# Patient Record
Sex: FEMALE | Race: WHITE | NOT HISPANIC OR LATINO | Employment: FULL TIME | URBAN - METROPOLITAN AREA
[De-identification: names, ages, dates, MRNs, and addresses within clinical notes are randomized per-mention and may not be internally consistent; named-entity substitution may affect disease eponyms.]

---

## 2017-01-02 ENCOUNTER — GENERIC CONVERSION - ENCOUNTER (OUTPATIENT)
Dept: OTHER | Facility: OTHER | Age: 37
End: 2017-01-02

## 2017-01-16 ENCOUNTER — ALLSCRIPTS OFFICE VISIT (OUTPATIENT)
Dept: OTHER | Facility: OTHER | Age: 37
End: 2017-01-16

## 2017-01-16 ENCOUNTER — GENERIC CONVERSION - ENCOUNTER (OUTPATIENT)
Dept: OTHER | Facility: OTHER | Age: 37
End: 2017-01-16

## 2017-01-20 ENCOUNTER — GENERIC CONVERSION - ENCOUNTER (OUTPATIENT)
Dept: OTHER | Facility: OTHER | Age: 37
End: 2017-01-20

## 2017-02-01 ENCOUNTER — GENERIC CONVERSION - ENCOUNTER (OUTPATIENT)
Dept: OTHER | Facility: OTHER | Age: 37
End: 2017-02-01

## 2017-02-02 ENCOUNTER — LAB CONVERSION - ENCOUNTER (OUTPATIENT)
Dept: FAMILY MEDICINE CLINIC | Facility: CLINIC | Age: 37
End: 2017-02-02

## 2017-02-03 ENCOUNTER — ALLSCRIPTS OFFICE VISIT (OUTPATIENT)
Dept: OTHER | Facility: OTHER | Age: 37
End: 2017-02-03

## 2017-02-06 ENCOUNTER — ALLSCRIPTS OFFICE VISIT (OUTPATIENT)
Dept: OTHER | Facility: OTHER | Age: 37
End: 2017-02-06

## 2017-02-08 ENCOUNTER — GENERIC CONVERSION - ENCOUNTER (OUTPATIENT)
Dept: OTHER | Facility: OTHER | Age: 37
End: 2017-02-08

## 2017-03-13 ENCOUNTER — GENERIC CONVERSION - ENCOUNTER (OUTPATIENT)
Dept: OTHER | Facility: OTHER | Age: 37
End: 2017-03-13

## 2017-03-16 ENCOUNTER — GENERIC CONVERSION - ENCOUNTER (OUTPATIENT)
Dept: OTHER | Facility: OTHER | Age: 37
End: 2017-03-16

## 2017-03-30 ENCOUNTER — GENERIC CONVERSION - ENCOUNTER (OUTPATIENT)
Dept: OTHER | Facility: OTHER | Age: 37
End: 2017-03-30

## 2017-04-02 LAB
EXTERNAL ABO GROUPING: NORMAL
EXTERNAL ANTIBODY SCREEN: NORMAL
EXTERNAL HEMATOCRIT: 43.3 %
EXTERNAL HEMOGLOBIN: 14.7 G/DL
EXTERNAL HEPATITIS B SURFACE ANTIGEN: NORMAL
EXTERNAL HIV-1 ANTIBODY: NORMAL
EXTERNAL PLATELET COUNT: 161 K/ΜL
EXTERNAL RH FACTOR: POSITIVE
EXTERNAL RUBELLA IGG QUANTITATION: NORMAL
EXTERNAL SYPHILIS RPR SCREEN: NORMAL

## 2017-04-22 LAB
EXTERNAL CHLAMYDIA SCREEN: NORMAL
EXTERNAL GONORRHEA SCREEN: NORMAL

## 2017-05-12 ENCOUNTER — GENERIC CONVERSION - ENCOUNTER (OUTPATIENT)
Dept: OTHER | Facility: OTHER | Age: 37
End: 2017-05-12

## 2017-05-15 ENCOUNTER — ALLSCRIPTS OFFICE VISIT (OUTPATIENT)
Dept: PERINATAL CARE | Facility: CLINIC | Age: 37
End: 2017-05-15
Payer: COMMERCIAL

## 2017-05-15 ENCOUNTER — GENERIC CONVERSION - ENCOUNTER (OUTPATIENT)
Dept: OTHER | Facility: OTHER | Age: 37
End: 2017-05-15

## 2017-05-15 PROCEDURE — 76805 OB US >/= 14 WKS SNGL FETUS: CPT | Performed by: OBSTETRICS & GYNECOLOGY

## 2017-05-30 ENCOUNTER — ALLSCRIPTS OFFICE VISIT (OUTPATIENT)
Dept: OTHER | Facility: OTHER | Age: 37
End: 2017-05-30

## 2017-05-30 DIAGNOSIS — E04.1 NONTOXIC SINGLE THYROID NODULE: ICD-10-CM

## 2017-06-01 ENCOUNTER — GENERIC CONVERSION - ENCOUNTER (OUTPATIENT)
Dept: OTHER | Facility: OTHER | Age: 37
End: 2017-06-01

## 2017-06-01 ENCOUNTER — ALLSCRIPTS OFFICE VISIT (OUTPATIENT)
Dept: PERINATAL CARE | Facility: CLINIC | Age: 37
End: 2017-06-01
Payer: COMMERCIAL

## 2017-06-01 PROCEDURE — 76815 OB US LIMITED FETUS(S): CPT | Performed by: OBSTETRICS & GYNECOLOGY

## 2017-06-01 PROCEDURE — 76817 TRANSVAGINAL US OBSTETRIC: CPT | Performed by: OBSTETRICS & GYNECOLOGY

## 2017-06-06 ENCOUNTER — HOSPITAL ENCOUNTER (OUTPATIENT)
Dept: ULTRASOUND IMAGING | Facility: MEDICAL CENTER | Age: 37
Discharge: HOME | End: 2017-06-06
Payer: COMMERCIAL

## 2017-06-06 DIAGNOSIS — E04.1 NONTOXIC SINGLE THYROID NODULE: ICD-10-CM

## 2017-06-06 PROCEDURE — 76536 US EXAM OF HEAD AND NECK: CPT

## 2017-06-12 ENCOUNTER — GENERIC CONVERSION - ENCOUNTER (OUTPATIENT)
Dept: OTHER | Facility: OTHER | Age: 37
End: 2017-06-12

## 2017-06-12 ENCOUNTER — ALLSCRIPTS OFFICE VISIT (OUTPATIENT)
Dept: PERINATAL CARE | Facility: CLINIC | Age: 37
End: 2017-06-12
Payer: COMMERCIAL

## 2017-06-12 PROCEDURE — 76815 OB US LIMITED FETUS(S): CPT | Performed by: OBSTETRICS & GYNECOLOGY

## 2017-06-12 PROCEDURE — 76817 TRANSVAGINAL US OBSTETRIC: CPT | Performed by: OBSTETRICS & GYNECOLOGY

## 2017-06-13 ENCOUNTER — GENERIC CONVERSION - ENCOUNTER (OUTPATIENT)
Dept: OTHER | Facility: OTHER | Age: 37
End: 2017-06-13

## 2017-06-30 ENCOUNTER — GENERIC CONVERSION - ENCOUNTER (OUTPATIENT)
Dept: OTHER | Facility: OTHER | Age: 37
End: 2017-06-30

## 2017-06-30 ENCOUNTER — ALLSCRIPTS OFFICE VISIT (OUTPATIENT)
Dept: PERINATAL CARE | Facility: CLINIC | Age: 37
End: 2017-06-30
Payer: COMMERCIAL

## 2017-06-30 PROCEDURE — 76811 OB US DETAILED SNGL FETUS: CPT | Performed by: OBSTETRICS & GYNECOLOGY

## 2017-06-30 PROCEDURE — 76817 TRANSVAGINAL US OBSTETRIC: CPT | Performed by: OBSTETRICS & GYNECOLOGY

## 2017-08-10 LAB — GLUCOSE P FAST SERPL-MCNC: 123 MG/DL

## 2017-09-09 LAB
EXTERNAL HEMATOCRIT: 39.9 %
EXTERNAL HEMOGLOBIN: 14 G/DL
EXTERNAL PLATELET COUNT: 107 K/ΜL

## 2017-09-21 ENCOUNTER — HOSPITAL ENCOUNTER (OUTPATIENT)
Facility: HOSPITAL | Age: 37
Discharge: HOME | End: 2017-09-21
Attending: OBSTETRICS & GYNECOLOGY | Admitting: OBSTETRICS & GYNECOLOGY
Payer: COMMERCIAL

## 2017-09-21 VITALS
BODY MASS INDEX: 26.05 KG/M2 | WEIGHT: 147 LBS | TEMPERATURE: 98.3 F | RESPIRATION RATE: 20 BRPM | DIASTOLIC BLOOD PRESSURE: 84 MMHG | HEART RATE: 73 BPM | SYSTOLIC BLOOD PRESSURE: 134 MMHG | HEIGHT: 63 IN

## 2017-09-21 DIAGNOSIS — Z3A.33 33 WEEKS GESTATION OF PREGNANCY: ICD-10-CM

## 2017-09-21 PROCEDURE — G0463 HOSPITAL OUTPT CLINIC VISIT: HCPCS

## 2017-09-21 PROCEDURE — 99214 OFFICE O/P EST MOD 30 MIN: CPT

## 2017-09-21 PROCEDURE — 76817 TRANSVAGINAL US OBSTETRIC: CPT | Performed by: OBSTETRICS & GYNECOLOGY

## 2017-09-21 PROCEDURE — 59025 FETAL NON-STRESS TEST: CPT | Performed by: OBSTETRICS & GYNECOLOGY

## 2017-09-21 RX ORDER — LEVOTHYROXINE SODIUM 0.03 MG/1
25 TABLET ORAL DAILY
COMMUNITY
End: 2018-05-27 | Stop reason: SDUPTHER

## 2017-10-11 ENCOUNTER — ANESTHESIA EVENT (INPATIENT)
Dept: LABOR AND DELIVERY | Facility: HOSPITAL | Age: 37
End: 2017-10-11
Payer: COMMERCIAL

## 2017-10-11 ENCOUNTER — ANESTHESIA (INPATIENT)
Dept: LABOR AND DELIVERY | Facility: HOSPITAL | Age: 37
End: 2017-10-11
Payer: COMMERCIAL

## 2017-10-11 ENCOUNTER — HOSPITAL ENCOUNTER (INPATIENT)
Facility: HOSPITAL | Age: 37
LOS: 2 days | Discharge: HOME | End: 2017-10-13
Attending: OBSTETRICS & GYNECOLOGY | Admitting: OBSTETRICS & GYNECOLOGY
Payer: COMMERCIAL

## 2017-10-11 DIAGNOSIS — D69.6 GESTATIONAL THROMBOCYTOPENIA (HCC): ICD-10-CM

## 2017-10-11 DIAGNOSIS — O42.019: ICD-10-CM

## 2017-10-11 DIAGNOSIS — Z3A.36 PREGNANCY WITH 36 COMPLETED WEEKS GESTATION: ICD-10-CM

## 2017-10-11 DIAGNOSIS — O42.90 AMNIOTIC FLUID LEAKING: ICD-10-CM

## 2017-10-11 DIAGNOSIS — O99.119 GESTATIONAL THROMBOCYTOPENIA (HCC): ICD-10-CM

## 2017-10-11 DIAGNOSIS — E04.1 THYROID NODULE: ICD-10-CM

## 2017-10-11 LAB
ABO GROUP BLD: NORMAL
BASE EXCESS BLDCOA CALC-SCNC: -4.2 MMOL/L (ref 3–11)
BASE EXCESS BLDCOV CALC-SCNC: -2.8 MMOL/L (ref 1–9)
BASOPHILS # BLD AUTO: 0.01 THOUSANDS/ΜL (ref 0–0.1)
BASOPHILS NFR BLD AUTO: 0 % (ref 0–1)
BLD GP AB SCN SERPL QL: NEGATIVE
EOSINOPHIL # BLD AUTO: 0.04 THOUSAND/ΜL (ref 0–0.61)
EOSINOPHIL NFR BLD AUTO: 0 % (ref 0–6)
ERYTHROCYTE [DISTWIDTH] IN BLOOD BY AUTOMATED COUNT: 12.9 % (ref 11.6–15.1)
HCO3 BLDCOA-SCNC: 25.7 MMOL/L (ref 17.3–27.3)
HCO3 BLDCOV-SCNC: 22.4 MMOL/L (ref 12.2–28.6)
HCT VFR BLD AUTO: 42.2 % (ref 34.8–46.1)
HGB BLD-MCNC: 15 G/DL (ref 11.5–15.4)
LYMPHOCYTES # BLD AUTO: 1.62 THOUSANDS/ΜL (ref 0.6–4.47)
LYMPHOCYTES NFR BLD AUTO: 15 % (ref 14–44)
MCH RBC QN AUTO: 32.5 PG (ref 26.8–34.3)
MCHC RBC AUTO-ENTMCNC: 35.5 G/DL (ref 31.4–37.4)
MCV RBC AUTO: 91 FL (ref 82–98)
MONOCYTES # BLD AUTO: 0.55 THOUSAND/ΜL (ref 0.17–1.22)
MONOCYTES NFR BLD AUTO: 5 % (ref 4–12)
NEUTROPHILS # BLD AUTO: 8.63 THOUSANDS/ΜL (ref 1.85–7.62)
NEUTS SEG NFR BLD AUTO: 80 % (ref 43–75)
O2 CT VFR BLDCOA CALC: 4.6 ML/DL
OXYHGB MFR BLDCOA: 19.6 %
OXYHGB MFR BLDCOV: 67.3 %
PCO2 BLDCOA: 67.4 MM[HG] (ref 30–60)
PCO2 BLDCOV: 40.3 MM HG (ref 27–43)
PH BLDCOA: 7.2 [PH] (ref 7.23–7.43)
PH BLDCOV: 7.36 [PH] (ref 7.19–7.49)
PLATELET # BLD AUTO: 116 THOUSANDS/UL (ref 149–390)
PMV BLD AUTO: 13.2 FL (ref 8.9–12.7)
PO2 BLDCOA: 13.5 MM HG (ref 5–25)
PO2 BLDCOV: 27.6 MM HG (ref 15–45)
RBC # BLD AUTO: 4.62 MILLION/UL (ref 3.81–5.12)
RH BLD: POSITIVE
SAO2 % BLDCOV: 17 ML/DL
SPECIMEN EXPIRATION DATE: NORMAL
WBC # BLD AUTO: 10.85 THOUSAND/UL (ref 4.31–10.16)

## 2017-10-11 PROCEDURE — 85025 COMPLETE CBC W/AUTO DIFF WBC: CPT | Performed by: OBSTETRICS & GYNECOLOGY

## 2017-10-11 PROCEDURE — 86900 BLOOD TYPING SEROLOGIC ABO: CPT | Performed by: OBSTETRICS & GYNECOLOGY

## 2017-10-11 PROCEDURE — 99211 OFF/OP EST MAY X REQ PHY/QHP: CPT

## 2017-10-11 PROCEDURE — 86901 BLOOD TYPING SEROLOGIC RH(D): CPT | Performed by: OBSTETRICS & GYNECOLOGY

## 2017-10-11 PROCEDURE — G0463 HOSPITAL OUTPT CLINIC VISIT: HCPCS

## 2017-10-11 PROCEDURE — 82805 BLOOD GASES W/O2 SATURATION: CPT | Performed by: OBSTETRICS & GYNECOLOGY

## 2017-10-11 PROCEDURE — 86850 RBC ANTIBODY SCREEN: CPT | Performed by: OBSTETRICS & GYNECOLOGY

## 2017-10-11 PROCEDURE — 86592 SYPHILIS TEST NON-TREP QUAL: CPT | Performed by: OBSTETRICS & GYNECOLOGY

## 2017-10-11 RX ORDER — LEVOTHYROXINE SODIUM 0.03 MG/1
25 TABLET ORAL
Status: DISCONTINUED | OUTPATIENT
Start: 2017-10-12 | End: 2017-10-13 | Stop reason: HOSPADM

## 2017-10-11 RX ORDER — CALCIUM CARBONATE 200(500)MG
1000 TABLET,CHEWABLE ORAL DAILY PRN
Status: DISCONTINUED | OUTPATIENT
Start: 2017-10-11 | End: 2017-10-13 | Stop reason: HOSPADM

## 2017-10-11 RX ORDER — SIMETHICONE 80 MG
80 TABLET,CHEWABLE ORAL 4 TIMES DAILY PRN
Status: DISCONTINUED | OUTPATIENT
Start: 2017-10-11 | End: 2017-10-13 | Stop reason: HOSPADM

## 2017-10-11 RX ORDER — ACETAMINOPHEN 325 MG/1
650 TABLET ORAL EVERY 4 HOURS PRN
Status: DISCONTINUED | OUTPATIENT
Start: 2017-10-11 | End: 2017-10-13 | Stop reason: HOSPADM

## 2017-10-11 RX ORDER — OXYTOCIN/RINGER'S LACTATE 30/500 ML
2 PLASTIC BAG, INJECTION (ML) INTRAVENOUS
Status: DISCONTINUED | OUTPATIENT
Start: 2017-10-11 | End: 2017-10-11

## 2017-10-11 RX ORDER — DIAPER,BRIEF,INFANT-TODD,DISP
1 EACH MISCELLANEOUS AS NEEDED
Status: DISCONTINUED | OUTPATIENT
Start: 2017-10-11 | End: 2017-10-13 | Stop reason: HOSPADM

## 2017-10-11 RX ORDER — OXYTOCIN/RINGER'S LACTATE 30/500 ML
1-30 PLASTIC BAG, INJECTION (ML) INTRAVENOUS
Status: DISCONTINUED | OUTPATIENT
Start: 2017-10-11 | End: 2017-10-11

## 2017-10-11 RX ORDER — ROPIVACAINE HYDROCHLORIDE 2 MG/ML
INJECTION, SOLUTION EPIDURAL; INFILTRATION; PERINEURAL AS NEEDED
Status: DISCONTINUED | OUTPATIENT
Start: 2017-10-11 | End: 2017-10-11

## 2017-10-11 RX ORDER — ONDANSETRON 2 MG/ML
4 INJECTION INTRAMUSCULAR; INTRAVENOUS EVERY 8 HOURS PRN
Status: DISCONTINUED | OUTPATIENT
Start: 2017-10-11 | End: 2017-10-13 | Stop reason: HOSPADM

## 2017-10-11 RX ORDER — MAGNESIUM HYDROXIDE/ALUMINUM HYDROXICE/SIMETHICONE 120; 1200; 1200 MG/30ML; MG/30ML; MG/30ML
15 SUSPENSION ORAL EVERY 6 HOURS PRN
Status: DISCONTINUED | OUTPATIENT
Start: 2017-10-11 | End: 2017-10-13 | Stop reason: HOSPADM

## 2017-10-11 RX ORDER — IBUPROFEN 600 MG/1
600 TABLET ORAL EVERY 4 HOURS PRN
Status: DISCONTINUED | OUTPATIENT
Start: 2017-10-11 | End: 2017-10-13 | Stop reason: HOSPADM

## 2017-10-11 RX ORDER — ACETAMINOPHEN 325 MG/1
650 TABLET ORAL EVERY 6 HOURS PRN
Status: DISCONTINUED | OUTPATIENT
Start: 2017-10-11 | End: 2017-10-11

## 2017-10-11 RX ORDER — OXYCODONE HYDROCHLORIDE AND ACETAMINOPHEN 5; 325 MG/1; MG/1
2 TABLET ORAL EVERY 4 HOURS PRN
Status: DISCONTINUED | OUTPATIENT
Start: 2017-10-11 | End: 2017-10-13 | Stop reason: HOSPADM

## 2017-10-11 RX ORDER — DOCUSATE SODIUM 100 MG/1
100 CAPSULE, LIQUID FILLED ORAL 2 TIMES DAILY
Status: DISCONTINUED | OUTPATIENT
Start: 2017-10-11 | End: 2017-10-13 | Stop reason: HOSPADM

## 2017-10-11 RX ORDER — ROPIVACAINE HYDROCHLORIDE 5 MG/ML
INJECTION, SOLUTION EPIDURAL; INFILTRATION; PERINEURAL AS NEEDED
Status: DISCONTINUED | OUTPATIENT
Start: 2017-10-11 | End: 2017-10-11 | Stop reason: SURG

## 2017-10-11 RX ORDER — OXYCODONE HYDROCHLORIDE AND ACETAMINOPHEN 5; 325 MG/1; MG/1
1 TABLET ORAL EVERY 4 HOURS PRN
Status: DISCONTINUED | OUTPATIENT
Start: 2017-10-11 | End: 2017-10-13 | Stop reason: HOSPADM

## 2017-10-11 RX ORDER — TERBUTALINE SULFATE 1 MG/ML
INJECTION, SOLUTION SUBCUTANEOUS
Status: COMPLETED
Start: 2017-10-11 | End: 2017-10-11

## 2017-10-11 RX ORDER — DIPHENHYDRAMINE HCL 25 MG
25 TABLET ORAL EVERY 6 HOURS PRN
Status: DISCONTINUED | OUTPATIENT
Start: 2017-10-11 | End: 2017-10-13 | Stop reason: HOSPADM

## 2017-10-11 RX ADMIN — IBUPROFEN 600 MG: 600 TABLET, FILM COATED ORAL at 20:53

## 2017-10-11 RX ADMIN — WITCH HAZEL 1 PAD: 500 SOLUTION RECTAL; TOPICAL at 20:53

## 2017-10-11 RX ADMIN — ROPIVACAINE HYDROCHLORIDE 10 ML: 5 INJECTION, SOLUTION EPIDURAL; INFILTRATION; PERINEURAL at 18:05

## 2017-10-11 RX ADMIN — ROPIVACAINE HYDROCHLORIDE: 2 INJECTION, SOLUTION EPIDURAL; INFILTRATION at 14:59

## 2017-10-11 RX ADMIN — Medication 2 MILLI-UNITS/MIN: at 13:45

## 2017-10-11 RX ADMIN — TERBUTALINE SULFATE 0.25 MG: 1 INJECTION, SOLUTION SUBCUTANEOUS at 15:18

## 2017-10-11 RX ADMIN — SODIUM CHLORIDE 2.5 MILLION UNITS: 9 INJECTION, SOLUTION INTRAVENOUS at 14:38

## 2017-10-11 RX ADMIN — SODIUM CHLORIDE 5 MILLION UNITS: 0.9 INJECTION, SOLUTION INTRAVENOUS at 10:38

## 2017-10-11 RX ADMIN — SODIUM CHLORIDE, SODIUM LACTATE, POTASSIUM CHLORIDE, AND CALCIUM CHLORIDE 1000 ML: .6; .31; .03; .02 INJECTION, SOLUTION INTRAVENOUS at 10:30

## 2017-10-11 RX ADMIN — HYDROCORTISONE 1 APPLICATION: 1 CREAM TOPICAL at 20:53

## 2017-10-11 RX ADMIN — Medication 8 ML: at 14:50

## 2017-10-11 RX ADMIN — ACETAMINOPHEN 650 MG: 325 TABLET, FILM COATED ORAL at 13:49

## 2017-10-11 RX ADMIN — BENZOCAINE AND MENTHOL: 20; .5 SPRAY TOPICAL at 20:53

## 2017-10-11 RX ADMIN — ACETAMINOPHEN 650 MG: 325 TABLET, FILM COATED ORAL at 23:44

## 2017-10-11 RX ADMIN — DOCUSATE SODIUM 100 MG: 100 CAPSULE, LIQUID FILLED ORAL at 20:52

## 2017-10-11 NOTE — OB LABOR/OXYTOCIN SAFETY PROGRESS
Oxytocin Safety Progress Check Note - Cherry Ramirez 30 y o  female MRN: [id number]    Unit/Bed#: L&D 323-01 Encounter: [id number]    Obstetric History       T0      L1     SAB0   TAB0   Ectopic0   Multiple0   Live Births1      Gestational Age: 36w0d  Dose (ashutosh-units/min) Oxytocin: 2 ashutosh-units/min  Contraction Frequency (minutes): Q 3min  Contraction Quality: Strong  Tachysystole: No   Dilation: 5        Effacement (%): 90  Station: -1  Baseline Rate: 130 bpm  Fetal Heart Rate: 130 BPM  FHR Category: Category I          Notes/comments:   Continue pitocin augmentation  Comfortable with epidural   Bladder drained for 800cc of urine              Em Chang DO 10/11/2017 5:27 PM

## 2017-10-11 NOTE — OB LABOR/OXYTOCIN SAFETY PROGRESS
Labor Progress Note - Leny Ryan 30 y o  female MRN: [id number]    Unit/Bed#: L&D 323-01 Encounter: [id number]    Obstetric History       T0      L1     SAB0   TAB0   Ectopic0   Multiple0   Live Births1      Gestational Age: 36w0d     Contraction Frequency (minutes): Q 5 min  Contraction Quality: Moderate  Tachysystole: No   Dilation: 3        Effacement (%): 80  Station: -2  Baseline Rate: 140 bpm  Fetal Heart Rate: 140 BPM  FHR Category: Category I          Notes/comments:   Offered ongoing expectant mgmt, augmentation with breast pump and augmentation with pitocin  Pt agreeable to pitocin  Will request epidural once contractions are more intense            Em Chang DO 10/11/2017 1:21 PM

## 2017-10-11 NOTE — L&D DELIVERY NOTE
Vaginal Delivery Summary - OB/GYN   Ladonna García 30 y o  female MRN: [id number]  Unit/Bed#: L&D 323-01 Encounter: [id number]          Predelivery Diagnosis:  1  Pregnancy at 36 weeks  2  Premature  rupture of membranes  3  Gestational thrombocytopenia   4  Advanced maternal age  11  Thyroid nodule on synthroid    Postdelivery Diagnosis:  1  Same as above  2  Delivery of term     Procedure: Spontaneous Vaginal Delivery     Attending:   Dr Merlyn Brooks     Assistant: Mychal    Anesthesia: Epidural    EBL: 882BY    Complications: tight nuchal cord     Specimens: cord blood, arterial and venous cord blood gasses, placenta to storage    Gases:  Arterial pH: 7 19  Venous pH: 7 36    Findings:   1  Viable male at 18, with APGARS of 4 and 8 at 1 and 5 minutes respectively  2  Spontaneous delivery of intact placenta at 1845  3  1 degree laceration not repaired    Brief history and labor course:  Ms Ladonna García is a 30 y o  [id number] at [facility]  She presented to labor and delivery complaining of rupture of membranes  Her pregnancy was complicated by history of  delivery on [place], gestational thrombocytopenia, AMA, thyroid nodule on sytnthroid  On exam in triage she was noted to be ruptured and 2/80/-2  She was admitted for PPROM and early labor  Patient was augmented by pitocin titration and received an epidural for labor pain  A prolonged deceleration for 7 minutes following epidural occurred and fetus was resuscitated and FSE was placed  Patient progressed well to complete  Persistent variables were noted on FHT and patient was checked and noted to be complete and in efforts to resuscitate the decelerations with maternal position change a prolonged deceleration was noted and immediate delivery was ensued  Description of procedure    After pushing for 2 minutes, at 18 patient delivered a viable male , wt pending, apgars of 4 (1 min) and 8 (5 min)   The fetal vertex delivered spontaneously following ritgen maneuver  Baby was checked for nuchal and a tight nuchal cord x1 was assessed around 's neck which was doubly clamped and cut  The anterior shoulder delivered atraumatically with maternal expulsive forces and the assistance of downward traction  The posterior shoulder delivered with maternal expulsive forces and the assistance of upward traction  The remainder of the fetus delivered spontaneously  Upon delivery, the infant was handed off to awaiting nurse resuscitators to evaluate the   There was no evidence for injury to the   Awaiting nurse resuscitators evaluated the   Arterial and venous cord blood gases and cord blood was collected for analysis  These were promptly sent to the lab  The placenta delivered spontaneously at [address] and was noted to have a centrally inserted 3 vessel cord  The vagina, cervix, perineum, and rectum were inspected and there was noted to be a a small hemostatic 1st degree perineal laceration that was not repaired  At the conclusion of the procedure, all needle, sponge, and instrument counts were noted to be correct  Patient tolerated the procedure well and was allowed to recover in labor and delivery room with family and  before being transferred to the post-partum floor  The attending was present and participated in all key portions of the case      Wilburn Masker [facility]  10/11/2017  7:15 PM

## 2017-10-11 NOTE — ANESTHESIA PREPROCEDURE EVALUATION
Review of Systems/Medical History  Patient summary reviewed  Chart reviewed  No history of anesthetic complications     Cardiovascular  Exercise tolerance: good,     Pulmonary  Asthma: well controlled/ stable Last rescue: > 1 year ago , ,        GI/Hepatic  Negative GI/hepatic ROS          Negative  ROS        Endo/Other  History of thyroid disease ,      GYN  Currently pregnant ,          Hematology    Thrombocytopenia,    Musculoskeletal  Negative musculoskeletal ROS        Neurology  Negative neurology ROS      Psychology   Negative psychology ROS            Physical Exam    Airway    Mallampati score: II  TM Distance: >3 FB  Neck ROM: full     Dental   No notable dental hx     Cardiovascular  Cardiovascular exam normal    Pulmonary  Pulmonary exam normal     Other Findings        Anesthesia Plan  ASA Score- 2       Anesthesia Type- epidural with ASA Monitors  Additional Monitors:   Airway Plan:           Induction-       Informed Consent- Anesthetic plan and risks discussed with patient

## 2017-10-11 NOTE — OB LABOR/OXYTOCIN SAFETY PROGRESS
[facility] NOTE:- Dillan Ballard 30 y o  female MRN: [id number]     Unit/Bed#: L&D 323-01 Encounter: [id number]    Obstetric History       T0      L1     SAB0   TAB0   Ectopic0   Multiple0   Live Births1      Gestational Age: 36w0d  Dose (ashutosh-units/min) Oxytocin: 2 ashutosh-units/min  Contraction Frequency (minutes): 1-2  Contraction Quality: Moderate  Tachysystole: No   Dilation: 3        Effacement (%): 90  Station: 0  Baseline Rate: 135 bpm  FHR Category: Category II     Present at [facility]:   Dr Rito Villeda    At [id number] patient had prolonged deceleration down to 65 bpm at lowest for 7 minutes following epidural   IVF were opened wide, Pitocin at 2 was stopped, position was changed from left and knees to chest, O2 was administered, FSE was placed and Terbutaline was administered  Following these actions Baseline returned to normal     Plan moving forward discussed with Dr Guerra Bile: Will give pit break for 20 minutes and restart pitocin at 1 which may be increased to 2 fifteen minutes after and then titrated 2x2 Q15 minutes  Will continue to monitor closely        Buena Gosselin, MD 10/11/2017 3:32 PM

## 2017-10-11 NOTE — ANESTHESIA POSTPROCEDURE EVALUATION
Post-Op Assessment Note      CV Status:  Stable    Mental Status:  Alert and awake    Hydration Status:  Euvolemic    PONV Controlled:  Controlled    Airway Patency:  Patent    Post Op Vitals Reviewed: Yes          Staff: Anesthesiologist     Post-op block assessment: no complications        BP      Temp      Pulse     Resp      SpO2

## 2017-10-11 NOTE — ANESTHESIA PROCEDURE NOTES
Epidural Block    Patient location during procedure: OB  Start time: 10/11/2017 2:21 PM  Staffing  Anesthesiologist: Naheed Mackey  Performed: anesthesiologist   Preanesthetic Checklist  Completed: patient identified, site marked, surgical consent, pre-op evaluation, timeout performed, IV checked, risks and benefits discussed and monitors and equipment checked  Epidural  Patient position: sitting  Prep: Betadine  Patient monitoring: heart rate, continuous pulse ox and frequent blood pressure checks  Approach: midline  Location: lumbar (1-5)  Injection technique: KEILA saline  Needle  Needle type: Tuohy   Needle gauge: 18 G  Test dose: negative and lidocaine 1 5% with epinephrine 1-to-200,000negative aspiration for CSF, negative aspiration for heme and no paresthesia on injection  patient tolerated the procedure well with no immediate complications

## 2017-10-12 LAB — RPR SER QL: NORMAL

## 2017-10-12 RX ADMIN — IBUPROFEN 600 MG: 600 TABLET, FILM COATED ORAL at 04:46

## 2017-10-12 RX ADMIN — DOCUSATE SODIUM 100 MG: 100 CAPSULE, LIQUID FILLED ORAL at 09:33

## 2017-10-12 RX ADMIN — DOCUSATE SODIUM 100 MG: 100 CAPSULE, LIQUID FILLED ORAL at 19:45

## 2017-10-12 RX ADMIN — OXYCODONE HYDROCHLORIDE AND ACETAMINOPHEN 1 TABLET: 5; 325 TABLET ORAL at 01:57

## 2017-10-12 RX ADMIN — OXYCODONE HYDROCHLORIDE AND ACETAMINOPHEN 1 TABLET: 5; 325 TABLET ORAL at 15:37

## 2017-10-12 RX ADMIN — IBUPROFEN 600 MG: 600 TABLET, FILM COATED ORAL at 09:33

## 2017-10-12 RX ADMIN — OXYCODONE HYDROCHLORIDE AND ACETAMINOPHEN 1 TABLET: 5; 325 TABLET ORAL at 11:10

## 2017-10-12 RX ADMIN — LEVOTHYROXINE SODIUM 25 MCG: 25 TABLET ORAL at 05:50

## 2017-10-12 RX ADMIN — OXYCODONE HYDROCHLORIDE AND ACETAMINOPHEN 1 TABLET: 5; 325 TABLET ORAL at 19:45

## 2017-10-12 RX ADMIN — IBUPROFEN 600 MG: 600 TABLET, FILM COATED ORAL at 14:20

## 2017-10-12 RX ADMIN — OXYCODONE HYDROCHLORIDE AND ACETAMINOPHEN 1 TABLET: 5; 325 TABLET ORAL at 23:53

## 2017-10-12 RX ADMIN — OXYCODONE HYDROCHLORIDE AND ACETAMINOPHEN 1 TABLET: 5; 325 TABLET ORAL at 05:50

## 2017-10-12 NOTE — DISCHARGE SUMMARY
Discharge Summary - Leny Ryan 30 y o  female MRN: [id number]    Unit/Bed#: L&D 323-01 Encounter: [id number]    Admission Date: 10/11/2017     Discharge Date: 10/13/2017    Delivering Attending: Hoda Ashraf DO  Discharge Attending: Hoda Ashraf DO    Admitting Diagnosis:   1  Pregnancy at 36 weeks  2  Premature  rupture of membranes  3  Gestational thrombocytopenia   4  Advanced maternal age  11  Thyroid nodule on synthroid    Discharge Diagnosis:   Same, delivered    Procedures: Spontaneous Vaginal Delivery    Complications: none apparent    Hospital Course:     Ms Leny Ryan is a 30 y o  [id number] at 36wks  She presented to labor and delivery complaining of rupture of membranes  Her pregnancy was complicated by history of  delivery on [place], gestational thrombocytopenia, AMA, thyroid nodule on sytnthroid  On exam in triage she was noted to be ruptured and 2/80/-2  She was admitted for PPROM and early labor  Patient was augmented by pitocin titration and received an epidural for labor pain  Patient progressed well to complete  Persistent variables were noted on FHT and patient was checked and noted to be complete and in efforts to resuscitate the decelerations with maternal position change a prolonged deceleration was noted and immediate delivery was ensued  She delivered a viable male  on 10/11/17 at [address]  Weight 5 lbs 8 oz via spontaneous vaginal delivery  Apgars were 4 (1 min) and 8 (5 min)   was transferred to  nursery  Patient tolerated the procedure well and was transferred to recovery in stable condition  Condition at discharge: good     On day of discharge pain was well controlled, patient was tolerating PO and passing flatus  She was discharged on postpartum day #2 with standard post partum instructions to follow up with her physician in 3-6 weeks for a postpartum appointment       Discharge instructions/Information to patient and family:   See after visit summary for information provided to patient and family  Provisions for Follow-Up Care:  See after visit summary for information related to follow-up care and any pertinent home health orders  Disposition: See After Visit Summary for discharge disposition information  Planned Readmission: No    Discharge Medications: For a complete list of the patient's medications, please refer to her med rec

## 2017-10-12 NOTE — PROGRESS NOTES
Progress Note - OB/GYN   John Shankar 30 y o  female MRN: [id number]  Unit/Bed#: L&D 306-01 Encounter: [id number]    Assessment:  PP #1 s/p Spontaneous Vaginal Delivery, stable    Plan:  1  Continue routine postpartum care  2  Encourage ambulation  3  Encourage breastfeeding  4  Advance diet as tolerated  5  Pain control as needed    Subjective/Objective   Chief Complaint:     PP #1 s/p Spontaneous Vaginal Delivery    Subjective:     Pain: reports cramping, improved with pain medication  Tolerating PO: yes  Voiding: yes  Flatus: yes  BM: no  Ambulating: yes  Breastfeeding: Bottle feeding  Chest pain: no  Shortness of breath: no  Leg pain: no  Lochia: minimal    Objective:     Vitals:  Vitals:    10/11/17 2030 10/11/17 2045 10/11/17 2100 10/11/17 2336   BP: 114/76 121/66 108/66 102/61   Pulse: 82 72 84 68   Resp:    18   Temp:    98 2 °F (36 8 °C)   TempSrc:    Oral   SpO2:       Weight:       Height:           Physical Exam:     AAOx3, NAD  CV, RRR  CTA b/l, no WRR  Soft, non-tender, non-distended, no rebound or guarding  Uterine fundus firm and non-tender, at the umbilicus   Negative Dyan's bilaterally    Lab, Imaging and other studies: I have personally reviewed pertinent reports        Lab Results   Component Value Date    WBC 10 85 (H) 10/11/2017    HGB 15 0 10/11/2017    HCT 42 2 10/11/2017    MCV 91 10/11/2017     (L) 10/11/2017               Yumiko Dunlap DO  10/12/17

## 2017-10-13 VITALS
OXYGEN SATURATION: 97 % | BODY MASS INDEX: 26.4 KG/M2 | HEIGHT: 63 IN | SYSTOLIC BLOOD PRESSURE: 114 MMHG | HEART RATE: 64 BPM | TEMPERATURE: 98.2 F | RESPIRATION RATE: 16 BRPM | DIASTOLIC BLOOD PRESSURE: 78 MMHG | WEIGHT: 149 LBS

## 2017-10-13 RX ORDER — ACETAMINOPHEN 325 MG/1
TABLET ORAL
Qty: 30 TABLET | Refills: 0 | Status: SHIPPED | OUTPATIENT
Start: 2017-10-13 | End: 2021-05-10

## 2017-10-13 RX ORDER — IBUPROFEN 600 MG/1
600 TABLET ORAL EVERY 4 HOURS PRN
Qty: 30 TABLET | Refills: 0 | Status: SHIPPED | OUTPATIENT
Start: 2017-10-13 | End: 2018-02-20 | Stop reason: ALTCHOICE

## 2017-10-13 RX ORDER — DOCUSATE SODIUM 100 MG/1
100 CAPSULE, LIQUID FILLED ORAL 2 TIMES DAILY
Qty: 10 CAPSULE | Refills: 0 | Status: SHIPPED | OUTPATIENT
Start: 2017-10-13 | End: 2020-09-25

## 2017-10-13 RX ADMIN — LEVOTHYROXINE SODIUM 25 MCG: 25 TABLET ORAL at 06:09

## 2017-10-13 RX ADMIN — IBUPROFEN 600 MG: 600 TABLET, FILM COATED ORAL at 03:15

## 2017-10-13 RX ADMIN — OXYCODONE HYDROCHLORIDE AND ACETAMINOPHEN 1 TABLET: 5; 325 TABLET ORAL at 08:09

## 2017-10-13 RX ADMIN — DOCUSATE SODIUM 100 MG: 100 CAPSULE, LIQUID FILLED ORAL at 08:09

## 2017-10-13 NOTE — PROGRESS NOTES
Progress Note - OB/GYN   Karly Whitfield 30 y o  female MRN: [id number]  Unit/Bed#: L&D 306-01 Encounter: [id number]    Assessment:  30 y o  [id number] s/p Spontaneous Vaginal Delivery Postpartum day  2  Patient recovering well, Stable  Desires discharge today    Plan:  Continue routine post partum care  Pain management PRN  Encourage ambulation  Encourage breastfeeding  Follow up in office 6 weeks PP    Subjective/Objective   Chief Complaint:    Postpartum state    Subjective:   Patient doing well this morning and is excited to go home      Pain: yes, cramping, improved with meds  Tolerating PO: yes  Voiding: yes  Flatus: yes  BM: no  Ambulating: yes  Breastfeeding:  No, bottle  Chest pain: no  Shortness of breath: no  Leg pain: no  Lochia: minimal    Objective:     Vitals: Temp:  [98 1 °F (36 7 °C)-98 5 °F (36 9 °C)] 98 2 °F (36 8 °C)  HR:  [55-83] 55  Resp:  [17-18] 17  BP: (115-126)/(79-86) 120/79       Physical Exam:   General: NAD, alert, oriented  Cardio: Regular rate and rhythm, no murmur  Resp: nonlabored breathing, clear to auscultation bilaterally  Abdomen: Soft, no distension/rebound/guarding/tenderness   Fundus: Firm, non-tender, fundus: 1 cm below umbilicus  G/U: minimal lochia noted on pad  Lower Extremities: Non-tender, no palpable cords    Medications:  Current Facility-Administered Medications   Medication Dose Route Frequency    acetaminophen (TYLENOL) tablet 650 mg  650 mg Oral Q4H PRN    aluminum-magnesium hydroxide-simethicone (MYLANTA) 200-200-20 mg/5 mL oral suspension 15 mL  15 mL Oral Q6H PRN    benzocaine-menthol-lanolin-aloe (DERMOPLAST) 20-0 5 % topical spray   Topical 4x Daily PRN    calcium carbonate (TUMS) chewable tablet 1,000 mg  1,000 mg Oral Daily PRN    diphenhydrAMINE (BENADRYL) tablet 25 mg  25 mg Oral Q6H PRN    docusate sodium (COLACE) capsule 100 mg  100 mg Oral BID    hydrocortisone 1 % cream 1 application  1 application Topical PRN    ibuprofen (MOTRIN) tablet 600 mg 600 mg Oral Q4H PRN    levothyroxine tablet 25 mcg  25 mcg Oral Early Morning    ondansetron (ZOFRAN) injection 4 mg  4 mg Intravenous Q8H PRN    oxyCODONE-acetaminophen (PERCOCET) 5-325 mg per tablet 1 tablet  1 tablet Oral Q4H PRN    oxyCODONE-acetaminophen (PERCOCET) 5-325 mg per tablet 2 tablet  2 tablet Oral Q4H PRN    simethicone (MYLICON) chewable tablet 80 mg  80 mg Oral 4x Daily PRN    witch hazel-glycerin (TUCKS) topical pad 1 pad  1 pad Topical PRN       Labs:   No results found for this or any previous visit (from the past 24 hour(s))        Juan David [facility] [place] ROSSI  10/13/2017  6:27 AM

## 2017-10-13 NOTE — PLAN OF CARE
DISCHARGE PLANNING     Discharge to home or other facility with appropriate resources Progressing        INFECTION - ADULT     Absence or prevention of progression during hospitalization Progressing     Absence of fever/infection during neutropenic period Progressing        Knowledge Deficit     Verbalizes understanding of labor plan Progressing     Patient/family/caregiver demonstrates understanding of disease process, treatment plan, medications, and discharge instructions Progressing        PAIN - ADULT     Verbalizes/displays adequate comfort level or baseline comfort level Progressing        SAFETY ADULT     Patient will remain free of falls Progressing     Maintain or return to baseline ADL function Progressing     Maintain or return mobility status to optimal level Progressing

## 2017-11-05 LAB — PLACENTA IN STORAGE: NORMAL

## 2017-11-15 ENCOUNTER — GENERIC CONVERSION - ENCOUNTER (OUTPATIENT)
Dept: OTHER | Facility: OTHER | Age: 37
End: 2017-11-15

## 2017-11-28 ENCOUNTER — ALLSCRIPTS OFFICE VISIT (OUTPATIENT)
Dept: OTHER | Facility: OTHER | Age: 37
End: 2017-11-28

## 2017-12-05 ENCOUNTER — GENERIC CONVERSION - ENCOUNTER (OUTPATIENT)
Dept: OTHER | Facility: OTHER | Age: 37
End: 2017-12-05

## 2017-12-06 ENCOUNTER — ALLSCRIPTS OFFICE VISIT (OUTPATIENT)
Dept: OTHER | Facility: OTHER | Age: 37
End: 2017-12-06

## 2017-12-07 NOTE — PROGRESS NOTES
Assessment    1  Anxiety (300 00) (F41 9)   2  Thyroid nodule (241 0) (E04 1)   3  Synovial cyst of left popliteal space (727 51) (M71 22)    Plan  Anxiety    · Escitalopram Oxalate 10 MG Oral Tablet; Take 1 tablet daily  Thyroid nodule    · From  Synthroid 25 MCG Oral Tablet TAKE 1 TABLET DAILY To LevothyroxineSodium 25 MCG Oral Tablet (Synthroid) TAKE 1 TABLET DAILY    Discussion/Summary    --anxiety: Patient presents to discuss ongoing anxiety  Patient had been treated prior to her pregnancy with medication (BuSpar)  Her baby now is 3month-old  She is not breast feeding  She is interested in starting medication again  She does see a mental health counselor regularly  She also has an appointment with a psychiatrist in February  Will give trial of escitalopram 10 mg daily  Continue counseling  cyst left knee: Patient has been seen at [place] [address] (dr Rivero May)  Still with painof thyroid nodule: Being followed by Endocrinology  Status post biopsy  Next appointment in JanuaryRaynaud's  4-6 weeks, call if any problems  Recheck 4-6 weeks   Possible side effects of new medications were reviewed with the patient/guardian today  The treatment plan was reviewed with the patient/guardian  The patient/guardian understands and agrees with the treatment plan      Chief Complaint  Pt presents for medication f/u  History of Present Illness  Patient presents to discuss ongoing anxiety  Patient had been treated prior to her pregnancy with medication (BuSpar)  Her baby now is 3month-old  She is not breast feeding  She is interested in starting medication again  She does see a mental health counselor regularly  She also has an appointment with a psychiatrist in February  is also still having problems with her left knee and with symptoms of Raynaud's      Review of Systems   Constitutional: No fever, no chills, feels well, no tiredness, no recent weight gain or weight loss    Cardiovascular: No complaints of slow heart rate, no fast heart rate, no chest pain, no palpitations, no leg claudication, no lower extremity edema  Respiratory: No complaints of shortness of breath, no wheezing, no cough, no SOB on exertion, no orthopnea, no PND  Active Problems  1  Adjustment disorder with mixed anxiety and depressed mood (309 28) (F43 23)   2  Amenorrhea (626 0) (N91 2)   3  Arthralgia of multiple joints (719 49) (M25 50)   4  Elderly multigravida in second trimester (659 63) (O09 522)   5  Female fertility problem (628 9) (N97 9)   6  MARIANO (generalized anxiety disorder) (300 02) (F41 1)   7  History of  delivery, currently pregnant in second trimester (V23 41) (O09 212)   8  MDD (major depressive disorder), recurrent episode, mild (296 31) (F33 0)   9  Pregnancy (V22 2) (Z34 90)   10  Synovial cyst of left popliteal space (727 51) (M71 22)   11  Thyroid nodule (241 0) (E04 1)   12  Vitamin D deficiency (268 9) (E55 9)    Past Medical History  1  History of Abdominal pain, RLQ (right lower quadrant) (789 03) (R10 31)   2  History of Acute nonintractable headache, unspecified headache type (784 0) (R51)   3  History of Atypical lymphocytosis (288 61) (D72 820)   4  History of Baker cyst (727 51) (M71 20)   5  History of Chronic fatigue (780 79) (R53 82)   6  History of Elevated LFTs (790 6) (R79 89)   7  History of Flu vaccine need (V04 81) (Z23)   8  History of Flu-like symptoms (780 99) (R68 89)   9  History of abdominal pain (V13 89) (Z87 898)   10  History of backache (V13 59) (Z87 39)   11  History of fatigue (V13 89) (Z87 898)   12  Denied: History of head injury   13  History of hypothyroidism (V12 29) (Z86 39)   14  History of shortness of breath (V13 89) (Z87 898)   15  History of streptococcal pharyngitis (V12 09) (Z87 09)   16  History of urinary tract infection (V13 02) (Z87 440)   17  History of Knee instability, left (718 86) (M25 362)   18  History of Knee pain (719 46) (M25 049)   19   History of Localized swelling, mass, or lump of left lower extremity (782 2) (R22 42)   20  History of Mononucleosis (075) (B27 90)   21  History of Neck pain (723 1) (M54 2)   22  History of Need for immunization against influenza (V04 81) (Z23)   23  History of Night sweats (780 8) (R61)   24  History of Numbness in left leg (782 0) (R20 0)   25  History of Numbness of toes (782 0) (R20 0)   26  History of Right ear pain (388 70) (H92 01)   27  History of Screening for depression (V79 0) (Z13 89)   28  Denied: History of Seizures   29  History of Sore throat (462) (J02 9)   30  Vaginal delivery (V13 29) (Z87 42)    The active problems and past medical history were reviewed and updated today  Surgical History  1  History of Dilation And Curettage   2  History of Ovarian Cystectomy   3  History of Primary Repair Of Knee Ligament Cruciate Anterior    Family History  Mother    1  Family history of diabetes mellitus (V18 0) (Z83 3)   2  Family history of thyroid disease (V18 19) (Z83 49)   3  No family history of mental disorder  Father    4  No family history of mental disorder  Sister    5  Family history of thyroid disease (V18 19) (Z83 49)    Social History     · Denied: History of Drug use   · Never a smoker   · No alcohol use  The social history was reviewed and updated today  The social history was reviewed and is unchanged  Current Meds   1  Aspirin 81 MG CAPS; Therapy: (Recorded:85Ksa4123) to Recorded   2  Fish Oil CAPS; Therapy: (Recorded:49Ltq5585) to Recorded   3  Synthroid 25 MCG Oral Tablet; TAKE 1 TABLET DAILY; Therapy: (Recorded:53Eix9372) to Recorded   4  Vitamin D 1000 UNIT Oral Tablet; TAKE 1 TABLET DAILY; Therapy: (Recorded:31Inz3471) to Recorded    The medication list was reviewed and updated today  Allergies  1  No Known Drug Allergies  2  No Known Environmental Allergies   3   No Known Food Allergies    Vitals  Vital Signs    Recorded: 56AQC1577 11:24AM   Temperature 98 2 F, Tympanic   Heart Rate 62   Pulse Quality Normal Respiration Quality Normal   Respiration 16   Systolic 860, LUE, Sitting   Diastolic 84, LUE, Sitting   Height 5 ft 3 in   Weight 131 lb 9 oz   BMI Calculated 23 31   BSA Calculated 1 62   O2 Saturation 97, RA   LMP [id number]   Pain Scale 0     Future Appointments    Date/Time Provider Specialty Site   02/13/2018 03:00 PM NORAH Giron  Psychiatry [facility]   01/04/2018 04:00 PM Blessing Bardales, [facility] Endocrinology [facility] ENDOCRINOLOGY   01/12/2018 10:15 AM Eusebia Hammer MS, APRN, PMHCNS_BS  [facility] PSYCH ASSOC THERAP BABY ME   01/26/2018 10:15 AM Eusebia Hammer MS, APRN, PMHCNS_BS  [facility] ASS[facility] THERAPISTS   02/07/2018 04:15 PM Cynthia Butler, MS, APRN, PMHCNS_BS  [facility] PSYCH [facility] THERAPISTS       Signatures   Electronically signed by :  Ilir Ac DO; Dec  6 2017 11:44AM EST                       (Author)

## 2017-12-15 ENCOUNTER — GENERIC CONVERSION - ENCOUNTER (OUTPATIENT)
Dept: FAMILY MEDICINE CLINIC | Facility: CLINIC | Age: 37
End: 2017-12-15

## 2018-01-09 NOTE — RESULT NOTES
Message  arbovirus panel neg  pt notified      Verified Results  TEST IN [facility] ORDER [id number] 12:00AM Lala Charles     Test Name Result Flag Reference   WE ARE UNABLE TO ASCERTAIN THE TEST(S) YOU DESIRE  FROM THE FOLLOWING WRITTEN ORDER  UNCLEAR ORDER:      CLARIFY ARBOVIRUS PANEL IGM/IGG   SPECIMEN(S) SUBMITTED: 2 [facility]     RESOLUTION:      **     **     *******     *******     *********    **     **   ******   **     **     **   **     **          **           ****   **     **   **     **     ** ***      **          **           **  ** **     **   **     **     *****       ** *****    *******      **    ***     **   **     **     **   **     **     **   **           **     **     **   **     **     **    **    **     **   **           **     **     **   *********     **    **    *********   *********    **     **     **             Signatures   Electronically signed by :  Awilda Amaya DO; Jul 8 2016 12:12PM EST                       (Author)

## 2018-01-09 NOTE — RESULT NOTES
Message   stable     Verified Results  US THYROID [id number] 11:56AM Popeye Brown Order Number: [id number]     Test Name Result Flag Reference   US THYROID (Report)     THYROID ULTRASOUND     INDICATION: History of right-sided thyroid nodule     COMPARISON: January 6, 2016 and December 26, 2015     TECHNIQUE:  Ultrasound of the thyroid was performed with a high frequency linear transducer in transverse and sagittal planes including volumetric imaging sweeps as well as traditional still imaging technique  FINDINGS:   Right gland: 4 3 x 1 4 x 1 5 cm  Volume = 5 6 mL  Previously 5 1 mL   Parenchymal echotexture is mildly heterogeneous  Upper pole partially calcified nodule measuring 1 3 x 0 9 x 0 9 cm, previously 1 3 x 1 0 x 0 9 cm  This nodule was biopsied in January  Left gland: 4 5 x 1 2 x 1 3 cm  Volume = 2 1 mL  Previously 2 4 mL   Parenchymal echotexture is mildly heterogeneous  No discrete nodules are seen  Isthmus: 0 2 cm in AP dimension  No dominant nodules  IMPRESSION:   Stable right upper pole partially calcified nodule  Workstation performed: [id number]     Signed by:    Peyton Nava DO   5/3/16

## 2018-01-11 NOTE — PSYCH
Assessment    1  MARIANO (generalized anxiety disorder) (300 02) (F41 1)   2  MDD (major depressive disorder), recurrent episode, mild (296 31) (F33 0)    Plan    1  Follow-up Visit in 4 Weeks Evaluation and Treatment  Follow-up  Status: Hold For -   Scheduling  Requested for: [id number]    Chief Complaint  Follow up for depression and anxiety      History of Present Illness  Yung Bonner is 31year old white female with history of anxiety and depressive symptoms since premature birth of her son 3 years ago  She also went through 2 miscarriages afterwards while trying to get pregnant for the second time and is currently going through fertility treatment  She was referred for psychiatric evaluation by her PCP due to ongoing symptoms of anxiety and depression  She reports worrying on daily basis "about small things"; thinks "something bad is going to happen"  She also get very tense and irritated  Has low energy and decreased motivation  Sometimes feels sad, but denies feeling hopeless  No panic attacks  Denies suicidal or homicidal ideation  Denies psychotic symptoms  No history of manic episodes  Some obsessiveness - has to check if the door is locked, if iron or curling iron is unplugged  No eating disorder history  Sleep and appetite are adequate  Some weight gain recently (7 lb in 2 months)  No side effects from medications reported  PHQ-9 is 7 today  Review of Systems  anxiety, depression, compulsive behavior, unreasonable or irrational fears and emotional problems/concerns  Constitutional: feeling tired and recent 7 lb weight gain  ENT: no ear ache, no loss of hearing, no nosebleeds or nasal discharge, no sore throat or hoarseness  Cardiovascular: no complaints of slow or fast heart rate, no chest pain, no palpitations, no leg claudication or lower extremity edema  Respiratory: no complaints of shortness of breath, no wheezing, no dyspnea on exertion, no orthopnea or PND     Gastrointestinal: no complaints of abdominal pain, no constipation, no nausea or diarrhea, no vomiting, no bloody stools  Genitourinary: no complaints of dysuria, no incontinence, no pelvic pain, no dysmenorrhea, no vaginal discharge or abnormal vaginal bleeding  Musculoskeletal: lower back pain  Integumentary: no complaints of skin rash or lesion, no itching or dry skin, no skin wounds  Neurological: numbness  Toe numbness  ROS reviewed  Past Psychiatric History    Past Psychiatric History: No history of past inpatient psychiatric admissions  Has a therapist Aldo Roman  Has never seen a psychiatrist in the past  PCP was prescribing psychiatric medications in the past   Past medication trials with Wellbutrin (felt "foggy"), Xanax, BuSpar  No history of past suicide attempts  Substance Abuse Hx    Substance Abuse History: No history of recreational drug use  Drinks alcohol occasionally/socially  No tobacco           Active Problems    1  Amenorrhea (626 0) (N91 2)   2  Arthralgia of multiple joints (719 49) (M25 50)   3  Female fertility problem (628 9) (N97 9)   4  Synovial cyst of left popliteal space (727 51) (M71 22)   5  Thyroid nodule (241 0) (E04 1)   6  Vitamin D deficiency (268 9) (E55 9)    Past Medical History    1  History of Abdominal pain, RLQ (right lower quadrant) (789 03) (R10 31)   2  History of Acute nonintractable headache, unspecified headache type (784 0) (R51)   3  History of Atypical lymphocytosis (288 61) (D72 820)   4  History of Baker cyst (727 51) (M71 20)   5  History of Chronic fatigue (780 79) (R53 82)   6  History of Elevated LFTs (790 6) (R94 5)   7  History of Flu vaccine need (V04 81) (Z23)   8  History of Flu-like symptoms (780 99) (R68 89)   9  History of abdominal pain (V13 89) (Z87 898)   10  History of backache (V13 59) (Z87 39)   11  History of fatigue (V13 89) (Z87 898)   12  Denied: History of head injury   13  History of hypothyroidism (V12 29) (Z86 39)   14  History of shortness of breath (V13 89) (Z87 898)   15  History of streptococcal pharyngitis (V12 09) (Z87 09)   16  History of urinary tract infection (V13 02) (Z87 440)   17  History of Knee instability, left (718 86) (M25 362)   18  History of Knee pain (719 46) (M25 569)   19  History of Localized swelling, mass, or lump of left lower extremity (782 2) (R22 42)   20  History of Mononucleosis (075) (B27 90)   21  History of Neck pain (723 1) (M54 2)   22  History of Need for immunization against influenza (V04 81) (Z23)   23  History of Night sweats (780 8) (R61)   24  History of Numbness in left leg (782 0) (R20 0)   25  History of Numbness of toes (782 0) (R20 0)   26  History of Right ear pain (388 70) (H92 01)   27  History of Screening for depression (V79 0) (Z13 89)   28  Denied: History of Seizures   29  History of Sore throat (462) (J02 9)   30  Vaginal delivery (V13 29) (Z87 42)    The active problems and past medical history were reviewed and updated today  Surgical History    The surgical history was reviewed and updated today  Allergies    1  Gantrisin Pediatric SUSP    Current Meds   1  Aspirin 81 MG CAPS; Therapy: (Recorded:28Hed6494) to Recorded   2  BusPIRone HCl - 7 5 MG Oral Tablet; take 1 tablet twice a day; Therapy: 47NDG2326 to (Flores Apodaca)  Requested for: 08IPP3045 Recorded   3  Fish Oil CAPS; Therapy: (Recorded:16Sxz4186) to Recorded   4  Multi Prenatal TABS; Therapy: (Recorded:30Nvq1272) to Recorded   5  Vitamin D TABS; Therapy: (Recorded:38Bem4707) to Recorded    The medication list was reviewed and updated today  Family Psych History  Mother    1  Family history of diabetes mellitus (V18 0) (Z83 3)   2  Family history of thyroid disease (V18 19) (Z83 49)   3  No family history of mental disorder  Father    4  No family history of mental disorder  Sister    5   Family history of thyroid disease (V18 19) (Z83 49)    The family history was reviewed and updated today  Social History    · Denied: History of Drug use   · Never a smoker   · No alcohol use  The social history was reviewed and updated today  , lives with  and 1year old son  Works as a   Has bachelor's degree in interpreting for deaf  No history of legal problems  No  history  History Of Phys/Sex Abuse Or Perpetration    History Of Phys/Sex Abuse or Perpetration: No history of physical or sexual abuse  Vitals  Signs   Recorded: 50GQH4481 03:23PM   Heart Rate: 64  Systolic: 320  Diastolic: 85  Height: 5 ft 3 in  Weight: 127 lb   BMI Calculated: 22 5  BSA Calculated: 1 59    Physical Exam    Appearance: was calm and cooperative, adequate hygiene and grooming and good eye contact  Observed mood: depressed and anxious  Observed mood: affect was constricted  Speech: a normal rate  Thought processes: coherent/organized  Hallucinations: no hallucinations present  Thought Content: no delusions  Abnormal Thoughts: The patient has no suicidal thoughts and no homicidal thoughts  Orientation: The patient is oriented to person, place and time  Recent and Remote Memory: short term memory intact and long term memory intact  Attention Span And Concentration: concentration impaired  Insight: Insight intact  Judgment: Her judgment was intact  Muscle Strength And Tone  Muscle strength and tone were normal  Normal gait and station  Language: no difficulty naming common objects, no difficulty repeating a phrase and no difficulty writing a sentence  Fund of knowledge: Patient displays adequate knowledge of current events, adequate fund of knowledge regarding past history and adequate fund of knowledge regarding vocabulary  The patient is experiencing moderate to severe pain  On a scale of 0 - 10 the pain severity is a 5  Goals addressed in session:  At present patient wants to continue BuSpar 7 5 mg bid and potentially increase dose at the next visit if anxiety persists  She wants to discuss with her  potentially starting Zoloft id depressive symptoms worsen  She understands medications are only relatively safe in pregnancy (BuSpar is Cat B, Zoloft Cat C)  Continue therapy with own therapist        Risks, Benefits And Possible Side Effects Of Medications: Risks, benefits, and possible side effects of medications explained to patient and patient verbalizes understanding  End of Encounter Meds    1  BusPIRone HCl - 7 5 MG Oral Tablet; take 1 tablet twice a day; Therapy: 33CBI1637 to (Roro Martinez)  Requested for: 72ULM3162 Recorded    2  Aspirin 81 MG CAPS; Therapy: (Recorded:67Kqh6121) to Recorded   3  Fish Oil CAPS; Therapy: (Recorded:79Fju3806) to Recorded   4  Multi Prenatal TABS; Therapy: (Recorded:97Udh9301) to Recorded   5  Vitamin D TABS;    Therapy: (Recorded:16Bre0709) to Recorded    Future Appointments    Date/Time Provider Specialty Site   04/03/2017 03:45 PM Ronald Cespedes   01/03/2018 04:00 PM Cecilia Menezes, [facility] Endocrinology [facility] ENDOCRINOLOGY     Signatures   Electronically signed by : NORAH Adames ; Feb 6 2017  3:39PM EST                       (Author)

## 2018-01-11 NOTE — PROGRESS NOTES
2017         RE: Catherine Jones                               To: [facility]   MR#: [id number]                                   [address]    : [address]: [id number]                             [place], [address]   (Exam #: [id number])                           Fax: [phone]      The LMP of this 31year old,  G4, P0-1-2-1 patient was 2017, giving   her an VIKI of 2017 and a current gestational age of 22 weeks 2 days   by dates  A sonographic examination was performed on 2017 using   real time equipment  The ultrasound examination was performed using   abdominal & vaginal techniques  The patient has a BMI of 23 7  Her blood   pressure today was 114/81  Ezel Lavender has no complaints  She denies abdominal or pelvic pain,   vaginal bleeding, mucoid vaginal discharge, or suspected PPROM  Ezel Lavender   reports fetal movement  She is currently treated with weekly IM   progesterone given her history of a prior spontaneous  birth  She   is also treated with levothyroxine for the indication of hypothyroidism   A   TSH level obtained on  was normal at 1 65 uIU/ml      Cardiac motion was observed at 146 bpm       INDICATIONS      previous  delivery   advanced maternal age      Exam Types      LEVEL II   Transvaginal      RESULTS      Fetus # 1 of 1   Transverse presentation   Fetal growth appeared normal   Placenta Location = Anterior   No placenta previa   Placenta Grade = I      MEASUREMENTS (* Included In Average GA)      AC              17 5 cm        22 weeks 1 day * (66%)   BPD              5 0 cm        21 weeks 1 day * (45%)   HC              19 0 cm        21 weeks 1 day * (43%)   Femur            3 6 cm        21 weeks 4 days* (51%)      Nuchal Fold      3 7 mm   NBL              8 2 mm      Humerus          3 4 cm        21 weeks 4 days  (54%)      Cerebellum       2 3 cm 22 weeks 0 days   Biorbit          3 3 cm        21 weeks 2 days   CisternaMagna    3 9 mm      HC/AC           1 08   FL/AC           0 21   FL/BPD          0 72   EFW (Ac/Fl/Hc)   455 grams - 1 lbs 0 oz      THE AVERAGE GESTATIONAL AGE is 21 weeks 4 days +/- 10 days  AMNIOTIC FLUID         Largest Vertical Pocket = 5 1 cm   Amniotic Fluid: Normal      CERVICAL EVALUATION      The cervix appeared normal (Ultrasound Examination)  SUPINE      Cervical Length: 4 60 cm      OTHER TEST RESULTS           Funneling?: No             Dynamic Changes?: No        Resp  To TFP?: No                      Debris?: No      ANATOMY      Head                                    Normal   Face/Neck                               Normal   Th  Cav  Normal   Heart                                   Normal   Abd  Cav  Normal   Stomach                                 Normal   Right Kidney                            Normal   Left Kidney                             Normal   Bladder                                 Normal   Abd  Wall                               Normal   Spine                                   Normal   Extrems                                 Normal   Genitalia                               Normal   Placenta                                Normal   Umbl  Cord                              Normal   Uterus                                  Normal   PCI                                     Normal      ANATOMY DETAILS      Visualized Appearing Sonographically Normal:   HEAD: (Calvarium, BPD Level, Cavum, Lateral Ventricles, Choroid Plexus,   Cerebellum, Cisterna Magna);    FACE/NECK: (Neck, Nuchal Fold, Profile,   Orbits, Nose/Lips, Palate, Face);    TH  CAV  : (Lungs, Diaphragm);       HEART: (Four Chamber View, Proximal Left Outflow, Proximal Right Outflow,   3VV, 3 Vessel Trachea, Short Axis of Greater Vessels, Ductal Arch, Aortic   Arch, Interventricular Septum, Interatrial Septum, IVC, SVC, Cardiac Axis,   Cardiac Position);    ABD  CAV : (Liver);    STOMACH, RIGHT KIDNEY, LEFT   KIDNEY, BLADDER, ABD  WALL, SPINE: (Cervical Spine, Thoracic Spine, Lumbar   Spine, Sacrum);    EXTREMS: (Lt Humerus, Rt Humerus, Lt Forearm, Rt   Forearm, Lt Hand, Rt Hand, Lt Femur, Rt Femur, Lt Low Leg, Rt Low Leg, Lt   Foot, Rt Foot);    GENITALIA, PLACENTA, UMBL  CORD, UTERUS, PCI      ADNEXA      The left ovary appeared normal and measured 3 0 x 2 9 x 1 8 cm with a   volume of 8 2 cc  The right ovary appeared normal and measured 2 4 x 2 3 x   1 8 cm with a volume of 5 2 cc  IMPRESSION      Fontana IUP   21 weeks and 4 days by this ultrasound  (VIKI=NOV 6 2017)   Transverse presentation   Fetal growth appeared normal   Normal anatomy survey   Regular fetal heart rate of 146 bpm   Anterior placenta   No placenta previa      GENERAL COMMENT      No fetal structural abnormality or ultrasound marker for aneuploidy is   identified on the Level II ultrasound study today  Fetal growth and   amniotic fluid volume are normal   The placenta is normal in appearance  The cervix is normal in appearance by transvaginal sonography  The   cervical length is normal   Cervical debris is not present  Cervical   funneling is not present  Neither provocative nor dynamic change is   appreciated  Today's ultrasound findings and suggested follow-up were discussed in   detail with Jacinto Hernandez  We discussed that prenatal ultrasound cannot rule   out all congenital abnormalities  Transvaginal ultrasound assessment of   her cervix should be performed in 2 weeks  Cervical cerclage is   recommended with cervical shortening to 25 mm or less prior to 24 weeks   gestation  Continuation of weekly 17-OHPC is recommended through 36   completed weeks gestation   Repeat assessment of fetal interval growth is   recommended at about 32 weeks gestation for the indications of advanced   maternal age and hypothyroidism  We would be pleased to see Love Nicolette in   the [address] [place] [address] St for follow-up evaluation at any point later in the   pregnancy, should your group so choose  The face to face time, in addition to time spent discussing ultrasound   results, was approximately 10 minutes, greater than 50% of which was spent   during counseling and coordination of care  RAHEEL Lafleur M D     Maternal-Fetal Medicine   Electronically signed 07/01/17 10:42

## 2018-01-11 NOTE — PROGRESS NOTES
2017         RE: Raquel Pang                               To: [facility]   MR#: [id number]                                   [facility] [address]    : AUG [address]: [id number]                             [place], [address]   (Exam #: [id number])                           Fax: [phone]      The LMP of this 31year old,  G4, P0-1-2-1 patient was 2017, giving   her an VIKI of 2017 and a current gestational age of 12 weeks 1 day   by dates  A sonographic examination was performed on 2017 using real   time equipment  The ultrasound examination was performed using abdominal &   vaginal techniques  The patient has a BMI of 24 3  Her blood pressure   today was 105/72  Derral Slice has no complaints  She denies abdominal or pelvic pain,   vaginal bleeding, mucoid vaginal discharge, or suspected PPROM  Derral Slice began treatment last week with weekly IM progesterone given her   history of a prior spontaneous  birth  She also is treated with 81   mg of aspirin a day given a history of abruption complicating a prior   pregnancy  Cardiac motion was observed at 145 bpm       INDICATIONS      previous  delivery      Exam Types      Transvaginal      RESULTS      Fetus # 1 of 1   Transverse presentation   Placenta Location = Anterior   No placenta previa   Placenta Grade = 0      AMNIOTIC FLUID         Largest Vertical Pocket = 5 0 cm   Amniotic Fluid: Normal      CERVICAL EVALUATION      The cervix appeared normal (Ultrasound Examination)  SUPINE      Cervical Length: 3 70 cm      OTHER TEST RESULTS           Funneling?: No             Dynamic Changes?: No        Resp   To TFP?: No                      Debris?: No      IMPRESSION      Fontana IUP   Transverse presentation   Regular fetal heart rate of 145 bpm   Anterior placenta   No placenta previa      GENERAL COMMENT      The cervix is normal in appearance by transvaginal sonography  The   cervical length is normal   Cervical debris is not present  Cervical   funneling is not present  Neither provocative nor dynamic change is   appreciated  Today's ultrasound findings and suggested follow-up were discussed in   detail with Brittany Branch  Serial cervical sonography, every 2 weeks, will be   performed for ongoing surveillance through 23 completed weeks gestation  Cervical cerclage is recommended with cervical shortening to 25 mm of   below prior to 24 weeks gestation  Continuation of weekly 17-OHPC is   recommended through 36 completed weeks gestation  The face to face time, in addition to time spent discussing ultrasound   results, was approximately 10 minutes, greater than 50% of which was spent   during counseling and coordination of care  RAHEEL Em M D     Maternal-Fetal Medicine   Electronically signed 06/01/17 15:54

## 2018-01-12 VITALS
DIASTOLIC BLOOD PRESSURE: 74 MMHG | SYSTOLIC BLOOD PRESSURE: 113 MMHG | HEIGHT: 63 IN | WEIGHT: 131 LBS | BODY MASS INDEX: 23.21 KG/M2

## 2018-01-12 VITALS
BODY MASS INDEX: 23.75 KG/M2 | SYSTOLIC BLOOD PRESSURE: 114 MMHG | HEIGHT: 63 IN | WEIGHT: 134.05 LBS | DIASTOLIC BLOOD PRESSURE: 81 MMHG

## 2018-01-12 VITALS
HEART RATE: 64 BPM | HEIGHT: 63 IN | BODY MASS INDEX: 22.5 KG/M2 | SYSTOLIC BLOOD PRESSURE: 131 MMHG | WEIGHT: 127 LBS | DIASTOLIC BLOOD PRESSURE: 85 MMHG

## 2018-01-12 NOTE — PSYCH
History of Present Illness  Psychotherapy Provided [facility]: Individual Psychotherapy 30 minutes provided today  Goals addressed in session:   Met individually with pt for the initial session  Pt reports that she has been trying to get pregnant with her second child and that she has had two different miscarriages in the process  Pt reports that her first child was born 9 weeks early and since then she has struggled with anxiety  Pt reports that she was taking Wellbutrin before the first miscarriage then stopped when she got pregnant, started again after the miscarriage, then stopped when she got pregnant  Pt did not like how she felt on the Wellbutrin and would like to be on something that she can take should she get pregnant again  Pt sees an ongoing OP therapist and would like to be referred to a psychiatrist for medication management  HPI - Psych: Pt reports that she did not like how she felt on the Wellbutrin due to it making her feel "stupid" like she had more difficulty processing things  Pt is now taking BuSpar and feels it helps manage her anxiety some but that she is still experiencing the irritability  Pt reports that she has a supportive  and family throughout this process  Pt reports that she is able to complete tasks at work and home as needed  Pt denies SI   Note   Note:   Pt is in agreement with the referral to [facility] for a psychiatrist  Pt will continue to see her OP therapist that she is linked with  Pt will follow up with this worker in the future as needed and with Dr Stella Danielle regarding the medication until she is linked at [facility]  Assessment    1   Anxiety (300 00) (F41 9)    Signatures   Electronically signed by : Yadira Amaya LCSW; Jan 16 2017  9:57AM EST                       (Author)

## 2018-01-13 VITALS
DIASTOLIC BLOOD PRESSURE: 60 MMHG | WEIGHT: 132.01 LBS | BODY MASS INDEX: 23.39 KG/M2 | HEIGHT: 63 IN | SYSTOLIC BLOOD PRESSURE: 98 MMHG | HEART RATE: 76 BPM

## 2018-01-13 VITALS
WEIGHT: 131 LBS | DIASTOLIC BLOOD PRESSURE: 72 MMHG | BODY MASS INDEX: 23.21 KG/M2 | SYSTOLIC BLOOD PRESSURE: 105 MMHG | HEIGHT: 63 IN

## 2018-01-13 VITALS
WEIGHT: 126 LBS | BODY MASS INDEX: 22.32 KG/M2 | HEART RATE: 59 BPM | SYSTOLIC BLOOD PRESSURE: 118 MMHG | OXYGEN SATURATION: 99 % | DIASTOLIC BLOOD PRESSURE: 80 MMHG | HEIGHT: 63 IN | TEMPERATURE: 99.1 F

## 2018-01-13 NOTE — PROGRESS NOTES
MAY 15 2017         RE: Husam Monreal                               To: Riley Miller [facility] Life   MR#: [id number]                                   Be Rkp  93    : AUG Alexis 59: [id number]                             Þwil, Derek [address]   (Exam #: [id number])                           Fax: [phone]      The LMP of this 31year old,  G4, P0-1-2-1 patient was 2017, giving   her an VIKI of 2017 and a current gestational age of 12 weeks 5 days   by dates  A sonographic examination was performed on MAY 15 2017 using   real time equipment  The ultrasound examination was performed using   abdominal technique  The patient has a BMI of 22 8  Her blood pressure   today was 111/71  Cardiac motion was observed at 151 bpm       INDICATIONS      previous  delivery      Exam Types      Level I      RESULTS      Fetus # 1 of 1   Variable presentation   Fetal growth appeared normal   Placenta Location = Anterior   Placenta Grade = 0      MEASUREMENTS (* Included In Average GA)      AC               9 0 cm        14 weeks 6 days* (63%)   BPD              3 0 cm        15 weeks 4 days*   HC              10 9 cm        15 weeks 1 day *   Femur            1 5 cm        14 weeks 1 day *      Cerebellum       1 4 cm        15 weeks 4 days      HC/AC           1 21   FL/AC           0 16   FL/BPD          0 49   EFW (Ac/Fl/Hc)   106 grams - 0 lbs 4 oz      THE AVERAGE GESTATIONAL AGE is 14 weeks 6 days +/- 7 days  AMNIOTIC FLUID         Largest Vertical Pocket = 4 5 cm   Amniotic Fluid: Normal      ANATOMY      Head                                    See Details   Face/Neck                               See Details   Th  Cav  Normal   Heart                                   See Details   Abd  Cav                                 Normal   Stomach                                 Normal   Right Kidney Not Visualized   Left Kidney                             Not Visualized   Bladder                                 Normal   Abd  Wall                               Normal   Spine                                   See Details   Extrems                                 See Details   Placenta                                Normal   Umbl  Cord                              Normal   Uterus                                  Normal   PCI                                     Not Visualized      ANATOMY DETAILS      Visualized Appearing Sonographically Normal:   HEAD: (Calvarium, BPD Level, Lateral Ventricles, Choroid Plexus,   Cerebellum);    FACE/NECK: (Neck, Profile);    TH  CAV : (Diaphragm); HEART: (In2Games, Cardiac Axis, Cardiac Position);    ABD  CAV :   (Liver);    STOMACH, BLADDER, ABD  WALL, SPINE: (Cervical Spine, Thoracic   Spine, Lumbar Spine, Sacrum);    EXTREMS: (Lt Humerus, Rt Humerus, Rt   Forearm, Lt Hand, Rt Hand, Lt Femur, Lt Low Leg, Lt Foot); PLACENTA,   UMBL  CORD, UTERUS      Not Visualized:   HEAD: (Cavum, Cisterna Magna);    FACE/NECK: (Orbits, Nose/Lips, Palate,   Face); HEART: (Proximal Left Outflow, Proximal Right Outflow, 3VV, 3   Vessel Trachea, Short Axis of Greater Vessels, Ductal Arch, Aortic Arch,   Interventricular Septum, Interatrial Septum, IVC, SVC);    RIGHT KIDNEY,   LEFT KIDNEY, EXTREMS: (Lt Forearm, Rt Femur, Rt Low Leg, Rt Foot); PCI      ADNEXA      The left ovary appeared normal and measured 3 0 x 2 5 x 1 2 cm with a   volume of 4 7 cc  The right ovary appeared normal and measured 3 1 x 0 9 x   1 1 cm with a volume of 1 6 cc        IMPRESSION      Fontana IUP   14 weeks and 6 days by this ultrasound  (VIKI=NOV 7 2017)   Variable presentation   Fetal growth appeared normal   Regular fetal heart rate of 151 bpm   Anterior placenta      CONSULT COMMENT      Thank you very much for your kind referral of Pablito Paz to the   [facility]  in [place] on May 15, 2017 for early second trimester   ultrasound evaluation and MFM consult  Brittany Branch is a 68-year-old    4 para 5 white female who is currently at 14-5/7 weeks gestation by an   estimated due date of 2017 which is based on menstrual dating  She presents today for the indication of a history of a prior spontaneous    birth  Brittany Branch has had an unremarkable prenatal course so far  She has no complaints  She denies vaginal bleeding during the pregnancy  By history, cell free DNA analysis earlier in the pregnancy revealed   negative results  She will be 30years old at her estimated due date  Brittany Branch has a history of a prior spontaneous  birth at about 30   weeks gestation in   Her prenatal course had been unremarkable until   about 29 weeks gestation when she had onset of bright red vaginal   bleeding  She received a course of betamethasone for fetal lung   maturation  Over the course of the ensuing 9 days, she had intermittent   bright red vaginal bleeding with associated  labor  Underlying   placental abruption was suspected  She had a vaginal delivery on 2013 with a 3 lbs  5 oz  baby boy delivered  No underlying etiology   for placental abruption was apparently found  Her son was diagnosed   thereafter with craniosynostosis, which required surgical treatment at [facility]  He also required bilateral inguinal   hernia repair at age 3 months  Brittany Branch does not believe that her son's   diagnosis of craniosynostosis was thought to be genetic related, though he   apparently has not had detailed genetic studies obtained  He currently   receives physical therapy for low muscle tone, but is otherwise overall   healthy  Brittany Branch has a history of an anxiety disorder, for which she is   evaluated by Psychiatry  She recently discontinued BuSpar therapy on her   own   She has a history of an enlarged thyroid, and has had needle biopsy   of her thyroid performed with negative results  Vitaliy Costello is currently   treated with 25 micrograms of levothyroxine a day  A TSH level obtained on   March 17, 2017 was 2 07 uIU/ml  Her past OB history is otherwise   significant for 2 first trimester spontaneous pregnancy losses  Her past   surgical history is otherwise significant for removal of an ovarian cyst,   ACL repair, and a D & E procedure for one of her early losses  Vitaliy Costello   denies tobacco, alcohol, or illicit drug use during the pregnancy  The   family genetic history is noncontributory with the exception of her dad   having a sister with Down syndrome  Her mom and sister each have   hypothyroidism  The family medical history is negative with respect to   first year relatives with diabetes, hypertension, or venous   thromboembolism  Both Vitaliy Costello and her  apparently have been   found to have normal peripheral blood karyotype analyses, performed given   her history of 2 early spontaneous pregnancy losses  Vitaliy Costello recently underwent a detailed acquired and genetic   thrombophilia work up, and she has been evaluated by Dr Ric Tucker of   Rheumatology  By history, Vitaliy Costello has no definitive underlying   rheumatologic diagnosis  With respect to her recent laboratory studies, a   lupus anticoagulant was not detected  A functional protein S level was   normal at 85%  A functional protein C level was normal at 95%  Vitaliy Costello   does not carry the factor V Leiden mutation  Beta 2 glycoprotein   antibodies were negative  An anticardiolipin antibody IgM level was mildly   increased, though the IgG level was negative  We do not have copies of   additional components of a thrombophilia evaluation available for review   today, including prothrombin gene mutation 92076 A and antithrombin III   level, if previously done   Parenthetically, additional laboratory studies   recently obtained revealed normal complement levels, negative Sjogren's   antibodies,  negative antinuclear antibody and rheumatoid factor, and   normal sedimentation rate  Shannon John is currently treated with empiric low-dose aspirin therapy,   given her history of prior placental abruption and a mildly elevated   anticardiolipin antibody IgM level  Additional daily medications include a   prenatal vitamin, vitamin D supplementation, and fish oil  No fetal structural abnormality or ultrasound marker for aneuploidy is   identified on the ultrasound study today, limited in detail secondary to   the early gestational age  Fetal growth and amniotic fluid volume are   normal   The placenta is normal in appearance  Today's ultrasound findings and suggested follow-up were discussed in   detail with Shannon John and her   I agree with continuation of   empiric low-dose aspirin therapy given the anti-cardiolipin antibody IgM   level and her history of prior placental abruption with an unexplained   underlying etiology  We discussed that her history of prior placental   abruption is associated with an increased risk for adverse pregnancy   outcomes related to abnormal placental function in her current pregnancy,   including recurrence of abruption,  delivery, preeclampsia, and   fetal growth restriction  Clinical awareness should be maintained in this   regard  Additional medical treatment, including heparin, does not appear   to be indicated at this time based upon this history  We discussed treatment with weekly IM 17-OHPC during this pregnancy  Current [facility] guidelines suggest that women with a history of abruption   related to  labor and PPROM, or unrelated to other obvious causes   such as IV drug use or polyhydramnios, may be candidates for 17P  It is   reasonable option to treat Shannon John with weekly IM 17P, 250 mg, between   16 and 36 completed weeks gestation   Serial transvaginal ultrasound evaluations for cervical surveillance, every 2 weeks, are recommended   between 16 and 23-6/7 weeks gestation  Cervical cerclage is recommended   with cervical shortening to 25 mm or less during this time period  Cassandra Hernandez and I discussed that she should have ongoing follow-up with her   psychiatrist during this pregnancy, and we also discussed that medical   therapy for anxiety during pregnancy is a reasonable option if recommended   by her psychiatrist       Craniosynostosis is a condition which occurs because of premature fusion   of the cranial sutures  The incidence is one in 2,000 live births  It is   one of the most common human malformations  80-90% of cases are isolated,   while 10% to 20% are syndromic  DNA diagnosis is available to detect   mutations of the causative genes associated with craniosynostosis  Cassandra Hernandez and I discussed that it would be reasonable to perform genetic   studies on her son at some point based upon his diagnosis  The face to face time, in addition to time spent discussing ultrasound   results, was approximately 30 minutes, greater than 50% of which was spent   during counseling and coordination of care  RAHEEL Horta M D     Maternal-Fetal Medicine   Electronically signed 05/16/17 10:08

## 2018-01-13 NOTE — RESULT NOTES
Verified Results  US EXTREMITY SOFT TISSUE [id number] 01:32PM Marcus Wilmer Order Number: [id number]   Performing Comments: Left posterior knee with large palpable lump  confirm baker's cyst   - Patient Instructions: To schedule this appointment, please contact Central Scheduling at [phone]  Test Name Result Flag Reference   US EXTREMITY SOFT TISSUE (Report)     ULTRASOUND LEFT KNEE     TECHNIQUE:  Using a high frequency transducer, the soft tissues posterior left knee was scanned to evaluate palpable abnormality  INDICATION: Palpable abnormality posterior left knee     COMPARISON: Opposite right knee     FINDINGS:     Corresponding to the area of clinical concern is a mildly complex multi cystic, multiloculated collection measuring up to 3 0 x 1 0 x 2 8 cm compatible with a Baker's cyst  No vascularity  There may be trace fluid within the posterior right knee  IMPRESSION:     Bakers cyst left knee         Workstation performed: [id number]     Signed by:   Beatrice Brnuson MD   2/26/16

## 2018-01-13 NOTE — MISCELLANEOUS
Message   Recorded as Task   Date: 03/15/2017 04:30 PM, Created By: Evon Ashley   Task Name: Miscellaneous   Assigned To: Rafaela Marcial   Regarding Patient: Elvira Mg, Status: Active   CommentMallissa Castaneda - 15 Mar 2017 4:30 PM     TASK CREATED  Caller: Self; Personal; [phone] (Home); [phone] (Work)  PT called saying she missed your phone call  She wants to be taken off her medication because she is pregnant and wants to discuss it with you  Spoke with patient - she is pregnant and does not want to continue BuSpar  States her anxiety is controlled  Has been only taking one 7 5 mg tablet of BuSpar per day  Plan: discontinue BuSpar  Follow up appointment on 4/15/2017        Signatures   Electronically signed by : NORAH Webber ; Mar 16 2017 12:56PM EST                       (Author)

## 2018-01-14 NOTE — RESULT NOTES
Message   tsh normal after being off synthroid for a month - stay off it for now , morning cortisol and prolactin normal     Verified Results  (1) T4, FREE [id number] 08:31AM Franky Gentry     Test Name Result Flag Reference   T4, FREE 1 3 ng/dL  0 8-1 8     (1) CORTISOL AM SPECIMEN [id number] 08:31AM Franky Gentry   REPORT COMMENT:  FASTING:YES     Test Name Result Flag Reference   CORTISOL, A M  20 9 mcg/dL     Reference Range  8 a m  (7-9 a m ) Specimen: 4 0-22 0     (Q) THYROID PEROXIDASE AND THYROGLOBULIN ANTIBODIES [id number] 08:31AM Franky Gentry     Test Name Result Flag Reference   THYROGLOBULIN ANTIBODIES <1 IU/mL  < or = 1   THYROID PEROXIDASE$ANTIBODIES 1 IU/mL  <9     (Q) PROLACTIN [id number] 08:31AM Franky Gentry     Test Name Result Flag Reference   PROLACTIN 6 2 ng/mL     Reference Range   Females          Non-pregnant        3 0-30 0          Pregnant           10 0-209 0          Postmenopausal      2 0-20 0     (Q) TSH, 3RD GENERATION [id number] 08:31AM Franky Gentry     Test Name Result Flag Reference   TSH 1 62 mIU/L     Reference Range                         > or = 20 Years  0 40-4 50                              Pregnancy Ranges            First trimester    0 26-2 66            Second trimester   0 55-2 73            Third trimester    0 43-2 91

## 2018-01-14 NOTE — RESULT NOTES
Verified Results  US THYROID [id number] 05:54PM Sandra Quiroz Order Number: [id number]    - Patient Instructions: To schedule this appointment, please contact Central Scheduling at [phone]  Test Name Result Flag Reference   US THYROID (Report)     THYROID ULTRASOUND     INDICATION: Follow-up, thyroid nodule  Difficulty swallowing  COMPARISON: 12/30/2016     TECHNIQUE:  Ultrasound of the thyroid was performed with a high frequency linear transducer in transverse and sagittal planes including volumetric imaging sweeps as well as traditional still imaging technique  FINDINGS:   Predominantly homogeneous echotexture     Right gland: 4 8 x 1 9 x 1 8 cm  There is a right upper pole rim calcified nodule measuring 1 4 x 1 3 x 1 0 cm earlier 1 4 x 0 9 x 1 0 cm  No other nodules are seen     Left gland: 4 5 x 1 0 x 1 1 cm  No dominant nodules  Isthmus: The isthmus is 0 2 cm in AP dimension  IMPRESSION:        1  Solitary rim calcified right upper pole nodule without significant change from prior studies     2  No new nodules are seen             Workstation performed: [id number]     Signed by:   Heidi Robertson MD   6/8/17

## 2018-01-15 VITALS
SYSTOLIC BLOOD PRESSURE: 111 MMHG | DIASTOLIC BLOOD PRESSURE: 70 MMHG | HEIGHT: 63 IN | WEIGHT: 129.2 LBS | BODY MASS INDEX: 22.89 KG/M2

## 2018-01-15 NOTE — PROGRESS NOTES
2017         RE: Katlyn Grayson                               To: Lance Duffy of Life   MR#: [id number]                                   Be Rkp  93    : AUG Alexis 59: [id number] Hinson, Derek [address]   (Exam #: [id number])                           Fax: [phone]      The LMP of this 31year old,  G4, P0-1-2-1 patient was 2017, giving   her an VIKI of 2017 and a current gestational age of 22 weeks 5 days   by dates  A sonographic examination was performed on 2017 using   real time equipment  The ultrasound examination was performed using   abdominal & vaginal techniques  The patient has a BMI of 23 2  Her blood   pressure today was 113/74  Ogden Alosa has no complaints  She denies abdominal or pelvic pain,   vaginal bleeding, mucoid vaginal discharge, or suspected PPROM  Ogden David   continues to receive treatment with weekly IM progesterone given her   history of a prior spontaneous  birth  Cardiac motion was observed at 140 bpm       INDICATIONS      previous  delivery      Exam Types      Transvaginal      RESULTS      Fetus # 1 of 1   Breech presentation   Placenta Location = Anterior   No placenta previa   Placenta Grade = 0      AMNIOTIC FLUID         Largest Vertical Pocket = 4 9 cm   Amniotic Fluid: Normal      CERVICAL EVALUATION      The cervix appeared normal (Ultrasound Examination)  SUPINE      Cervical Length: 4 10 cm      OTHER TEST RESULTS           Funneling?: No             Dynamic Changes?: No        Resp  To TFP?: No                      Debris?: No      IMPRESSION      Fontana IUP   Breech presentation   Regular fetal heart rate of 140 bpm   Anterior placenta   No placenta previa      GENERAL COMMENT      The cervix is normal in appearance by transvaginal sonography  The   cervical length is normal   Cervical debris is not present  Cervical   funneling is not present  Neither provocative nor dynamic change is   appreciated  Today's ultrasound findings and suggested follow-up were discussed in   detail with Brittany Hope  She will return to the [facility]  in 2 weeks   for level II ultrasound evaluation and cervical sonography  Cervical   cerclage is recommended with cervical shortening to 25 mm or less prior to   24 weeks gestation  Continuation of weekly 17-OHPC is recommended through   36 completed weeks gestation  The face to face time, in addition to time spent discussing ultrasound   results, was approximately 10 minutes, greater than 50% of which was spent   during counseling and coordination of care  RAHEEL Zamora M D     Maternal-Fetal Medicine   Electronically signed 06/12/17 16:02

## 2018-01-15 NOTE — RESULT NOTES
Message   no malignancy - repeat thyroid ultrasound and reschedule f/u in 6 months      Verified Results  (1) FINE NEEDLE ASPIRATION [id number] 09:04AM Mason Estradalow     Test Name Result Flag Reference   LAB AP CASE REPORT (Report)     Non-gynecologic Cytology             Case: [id number]                  Authorizing Provider: Albert Lees MD     Collected:      06/17/2016 0904        Ordering Location:   [address] [place] [address]   Received:      06/17/2016 [facility] Ultrasound                              Pathologist:      Chintan Graham MD                                 Specimens:  A) - Thyroid, Right, upper pole                                    B) - Thyroid, Right, upper pole   LAB AP CYTO FINAL DIAGNOSIS (Report)     A,B  Thyroid, right, upper pole (fine needle aspiration):    - Monolayered sheets of benign follicular cells and abundant colloid  - No atypia or malignancy identified     - See note  Satisfactory for evaluation  Electronically signed by Chintan Graham MD on 6/20/2016 at 3:14 PM   LAB AP NONGYN NOTE (Report)     - The clinical history of a calcified thyroid nodule is noted  Although no   atypia or malignancy is identified, clinical correlation is required to   ensure adequate sampling of the nodule (see below ultrasound report)  - Due to issues related to adequate sampling of the calcified nodule, a   [place] category will not be assigned  - Portion of ultrasound biopsy report:   Under real-time ultrasound guidance and local anesthesia extent was 1st   made by KRYSTYNA Gee with 25-gauge needle and 2 samples were obtained,   it is uncertain if these were in the lesion and therefore greater   consideration should be given to the final passes  Additional 4 passes   were performed by myself using 22-gauge Chiba needles      - The treating physician has requested a sample(s) from the above thyroid   nodule(s) be sent for analysis by  [facility] Gene Expression  ([facility] GEC), performed by [facility] [address] [place] [address])  [employer] only performs this test on specimen(s) receiving a cytologic   diagnosis of [place] Category III or  IV  If such a diagnosis is rendered (above) specimen will be sent to   THE [facility], and a separate report with  results of [facility] will follow directly [facility] (typically taking   14 days)  If a cytologic  interpretation other than [place] category III or IV is rendered,   specimen will not be sent for [facility]   - Interpretation performed at [facility], [address] [facility]   [address]  LAB AP INTRAOPERATIVE CONSULTATION      Thyroid, right upper pole, FNAB on-site evaluation: Thyroid cells   present  Discussed with IR regarding needle placement, and difficulty of   sampling a calcified nodule  Dr Martha Shepherd  LAB AP GROSS DESCRIPTION      A  Thyroid, Right, upper pole: 20ml  Bloody, received in CytoLyt    B  Thyroid, Right, upper pole: 12 slides received ( 6 diff quik / 6   alcohol fixed )   LAB AP NONGYN ADDITIONAL INFORMATION (Report)     [employer] FDA approved ,  and ThinPrep Imaging System are   utilized with strict adherence to the 's instruction manual to   prepare gynecologic and non-gynecologic cytology specimens for the   production of ThinPrep slides as well as for gynecologic ThinPrep imaging  These processes have been validated by our laboratory and/or by the     These tests were developed and their performance characteristics   determined by [facility] or [employer]  They may not be cleared or approved by the [place]  Food and   Drug Administration  The FDA has determined that such clearance or   approval is not necessary  These tests are used for clinical purposes  They should not be regarded as investigational or for research   This   laboratory has been approved by CLIA 88, designated as a high-complexity   laboratory and is qualified to perform these tests  LAB AP CLINICAL INFORMATION      Size: 1 2X1 3X 9CM  Margins: SMOOTH Echogenicity: SOLID Microcalcs: ABSENT Flow: PERIPHERAL Size change: MINIMAL Suspicion level: NOT GIVEN Hx of Hashimoto's Thyroiditis: NO     US PATHOLOGY REPORT (NO CHARGE) [id number] 08:48AM Chapis Jimenez     Test Name Result Flag Reference   US PATHOLOGY REPORT (NO CHARGE) (Report)     Dear Dr Linh Puente: Thank you for the referral of the above named patient for thyroid nodule biopsy  As you know, the procedure was performed on 6/17/2016  We have received the final pathology report  You should have received a copy of the report, but if not, you may    access it through the Physician's Web Portal or by contacting the lab office at [phone]  In summary, the biopsy demonstrated      - Monolayered sheets of benign follicular cells and abundant colloid  - No atypia or malignancy identified  If you have any questions, please do not hesitate to contact me at [phone]       Sincerely,       Dajuan Rodrigues MD       Workstation performed: [id number]       Plan  Thyroid nodule    · US THYROID; Status:Hold For - Scheduling; Requested for:[id number];

## 2018-01-15 NOTE — RESULT NOTES
Verified Results  (1) DRUG ABUSE SCREEN, URINE ROUTINE 02Sep2016 08:51AM Alem Varner Order Number: [id number]     Test Name Result Flag Reference   AMPHETAMINE SCREEN URINE Negative ng/mL  Svxioe=3265   Amphetamine test includes Amphetamine and Methamphetamine  BARBITURATE SCREEN URINE Negative ng/mL  Lfbwcw=777   BENZODIAZEPINE SCREEN, URINE Negative ng/mL  Etndlr=493   CANNABINOID SCREEN URINE Negative ng/mL  Cutoff=50   COCAINE(METAB  )SCREEN, URINE Negative ng/mL  Ogzgue=478   METHADONE SCREEN, URINE Negative ng/mL  Jgqrep=889   OPIATE SCREEN URINE Negative ng/mL  Kgdzpb=288   Opiate test includes Codeine and Morphine only     PHENCYCLIDINE (PCP), QUAL, UR Negative ng/mL  Cutoff=25   PROPOXYPHENE, SCREEN Negative ng/mL  Supzuv=081   Performed at:  [address]  [id number]  : Charles Nicholson MD, Phone:  [phone]

## 2018-01-16 ENCOUNTER — GENERIC CONVERSION - ENCOUNTER (OUTPATIENT)
Dept: OTHER | Facility: OTHER | Age: 38
End: 2018-01-16

## 2018-01-16 NOTE — PSYCH
Progress Note  Psychotherapy Provided [facility]: Initial Evaluation provided today  Goals addressed in session:   Goal #1 : Decrease anxiety and depression; " I want to Feel Normal again "   D: This is the first session with this Therapist for this 30 yr old w   female pt Stormy Hernandez, or " Johnfrancisco javier Ramosaine")  She comes here today with her 10 week old infant son "Oswald",and she feeds him and handles his care very lovingly and attentively, during this evaluation session  (This form is used today, to expedite the session, while she is caregiving her baby ) She is alert and oriented x3  She has an anxious and intermittently tearful affect  her mood is anxious and slightly depressed  She denies any SI / HI/AH/VH  She has high anxiety much of the time,and she has obsessive worries, and some mild OCD traits (such as double-checking locks when she leaves the house, double-checking that burners have been turned off the stove,and that coffee-pot unplugged, etc ---but these activities don't interrupt her functioning )  She states she has had high sensitivity to noises, even to her 's chewing, and she had high sensitivity to loud voices and noise at work, prior to her leaving work for maternity WALT---she could barely stand the loud , incessant talking of one of her friends/ 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 [employer]  She has low motivation, low energy,and doesn't "feel normal " No paranoid ideation or delusional content expressed during interview  Her sleep is fair, at 6 hours per night  Her appetite is low,and she has a slim build  She is trying to eat nutritious foods each day  She has fair concentration  Good recent and remote Memory  Her insight is good  Judgement is intact  Pt Genesis Persons) states that she has a good marriage to her  , [facility], who is a ---but she feels irritable with him at times   She wants to "throw things at an automan" at times, but she doesn't do it  She states she has high patience with her 3 yr old son (who was born 9 weeks premature and who had an extended NICU stay, but is quite healthy now),and she states her 10 week old son is easily contented,and she has "no problems" with his occasional crying or with caring for his needs  She states her parents are both alive, they live about 4 hours away, and she has a loving relationship with them, but misses having them nearby to help her more often  her mother has anxiety,and is on Lexapro  Her father has no reported MH issues  Her maternal Grandmother has schizoaffective disorder, but has coped with it fairly well over the years, pt's mother told pt recently  Pt  had two miscarriages, in between having her two living sons---She is tearful re:these losses, in session today  Additional Active Listening, Support,and Validation,and some Grief Therapy, given during this Evaluation  Pt reports that her increased anxiety ,and tearful bouts, did occur after the premature birth of her first son,and again she is experiencing these symptoms since the birth of her 10 week old son  She states she and  are not going to have any more children  Pt's medical history includes thyroid problems, she has a current cyst on Thyroid,and she takes thyroid medication  She also has had a cyst on left Knee ,and she has some weakness/ less dexterity than in past, in her hands---she sees orthopedist for same  She says she does not have arthritis, though---that has been ruled-out  She denies any history of abuse  She denies any D&A abuse  She has no guns or weapons at home  She is given Psychoeducation  She has had past prescriptions for Wellbutrin (had cognitive side-effects,was d'c'd;)and past Xanax (pt did not want to take it),and past Buspar  She is not taking anything now   Pt may benefit from Zoloft or from what her mother benefits from (Lexapro)---she is given information,and she is willing to see a psychiatrist re: medication  We also review calm, Deep-Breathing/ Calm Visualization exercises,and pt to do these at home each day  She also does stress-management via exercise on equipment at her home, 30 minutes a day, or taking her children for walks outside  A: Generalized Anxiety Disorder, F41 10; Major Depressive disorder, recurrent, moderate, F33 1; Adjustment Disorder; R/O Postpartum Depression, F53 0  Pt has no thoughts of harming self or her babies or others  P: Continue Psychotherapy,and pt to see psychiatrist re: medications  Continue Deep-breathing/ Relaxation, positive Cognitive-reframing, and positive Coping skills  Pain Scale and Suicide Risk [facility]: Current Pain Assessment: moderate to severe   On a scale of 0 to 10, the patient rates current pain at 1   Current suicide risk is low   Behavioral Health Treatment Plan [facility]: Diagnosis and Treatment Plan explained to patient, patient relates understanding diagnosis and is agreeable to Treatment Plan  Assessment    1  MDD (major depressive disorder), recurrent episode, mild (296 31) (F33 0)   2  MARIANO (generalized anxiety disorder) (300 02) (F41 1)   3   Adjustment disorder with mixed anxiety and depressed mood (309 28) (F43 23)    Signatures   Electronically signed by : CHINO EalgeMHCNS-BC; Nov 28 2017  1:12PM EST                       (Author)    Electronically signed by : SUSHIL Eagle; Nov 28 2017  1:12PM EST                       (Author)    Electronically signed by : NORAH Riojas ; Nov 28 2017  2:02PM EST                       (Acknowledgement)

## 2018-01-17 NOTE — MISCELLANEOUS
Provider Comments  Provider Comments:   1st no show appointment with neurology  No call, no message received  Kody Gunter attempted to call the patient in regards to her missed appointment  Call went to voicemail, a message was left for the patient to call our office back to reschedule her missed appointment on 5/10/16  1st no show letter mailed to the patient's home on 5/13/16        Signatures   Electronically signed by : NORAH Peralta ; May 13 2016  1:11PM EST                       (Review)

## 2018-01-17 NOTE — RESULT NOTES
Message   stable     Verified Results  US THYROID [id number] 12:58PM Jignesh Chamberlain Order Number: [id number]    - Patient Instructions: To schedule this appointment, please contact Central Scheduling at [phone]  Test Name Result Flag Reference   US THYROID (Report)     THYROID ULTRASOUND     INDICATION: Nodule follow-up  Prior benign biopsy  COMPARISON: 6/17/2016 and prior     TECHNIQUE:  Ultrasound of the thyroid was performed with a high frequency linear transducer in transverse and sagittal planes including volumetric imaging sweeps as well as traditional still imaging technique  FINDINGS:   Normal homogeneous smooth echotexture  Right gland: 4 1 x 1 3 x 1 5 cm  Rim calcified nodule upper pole about 1 4 x 0 9 x 1 0 cm, without significant interval change  Left gland: 4 2 x 1 2 x 1 2 cm  No dominant nodules  Isthmus: The isthmus is 2 cm in AP dimension  IMPRESSION:      Right upper pole nodule without significant change  Continued surveillance, as clinically warranted               Workstation performed: [id number]     Signed by:   Astrid Pérez MD   12/30/16

## 2018-01-18 ENCOUNTER — GENERIC CONVERSION - ENCOUNTER (OUTPATIENT)
Dept: OTHER | Facility: OTHER | Age: 38
End: 2018-01-18

## 2018-01-18 ENCOUNTER — ALLSCRIPTS OFFICE VISIT (OUTPATIENT)
Dept: OTHER | Facility: OTHER | Age: 38
End: 2018-01-18

## 2018-01-18 DIAGNOSIS — N92.0 EXCESSIVE AND FREQUENT MENSTRUATION WITH REGULAR CYCLE: ICD-10-CM

## 2018-01-18 NOTE — PSYCH
Behavioral Health Outpatient Intake    Referred By: Tracy Jones  Intake Questions: there are no developmental disabilities  the patient does not have a hearing impairment  the patient does not have an ICM or CTT  patient is not taking injectable psychiatric medications  Employment: The patient is employed full time at [employer]  Emergency Contact Information:   Emergency Contact: SHAWN YAN   Relationship to Patient:    Phone Number: [phone]   Previous Psychiatric Treatment: She has not been previously seen by a psychiatrist     She has previously been seen by a therapist  Vanessa Martinez   History: no  service  She has not had combat service  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 the [employer]  Insurance Subscriber: [employer]   Primary Insurance: enosiX   ID number: [id number]   Group number: [id number]     Presenting Problem (in patient's words): ANXIETY AND DEPRESSION, MEDICATION MANAGEMENT  Substance Abuse: NONE  Previous Treatment: The patient has not been seen here in the past      Accepted as Patient   DR Hilda Toro 2/6/17 @ 3:00     Primary Care Physician: DR Zulema Moncada   Electronically signed by : Ander Diaz, ; Jan 16 2017 10:11AM EST                       (Author)    Electronically signed by : Ander Diaz, ; Jan 16 2017 10:13AM EST                       (Author)    Electronically signed by : Ander Diaz, ; Nov 22 2017 12:11PM EST                       (Author)

## 2018-01-18 NOTE — MISCELLANEOUS
Message   Recorded as Task   Date: 03/13/2017 10:30 AM, Created By: Socrates Mccarthy   Task Name: Miscellaneous   Assigned To: Paula Licea   Regarding Patient: Remigio Knapp, Status: Active   CommentReal Da - 13 Mar 2017 10:30 AM     TASK CREATED  Caller: Self; Personal; [phone] (Home); [phone] (Work)  PT called to cancel tomorrows appointment due to the weather  She would like to speak to you about the medication she is on because she recently found out she is pregnant  Message left on voice mail to call us back - patient not available  Patient is only on BuSpar - category B in pregnancy  Risks and benefits of treatment with BuSpar in pregnancy were reviewed with patient at the last visit on 2/6/2017 as she was trying to get pregnant        Signatures   Electronically signed by : NORAH Hubbard ; Mar 13 2017  5:01PM EST                       (Author)

## 2018-01-19 NOTE — PROGRESS NOTES
Assessment   1  Menorrhagia (626 2) (N92 0)   2  Anxiety (300 00) (F41 9)   3  Thyroid nodule (241 0) (E04 1)    Plan   Anxiety    · Escitalopram Oxalate 10 MG Oral Tablet; Take 1 tablet daily  Menorrhagia    · (1) CBC/PLT/DIFF; Status:Active; Requested KFI:[id number]; Thyroid nodule    · Levothyroxine Sodium 25 MCG Oral Tablet (Synthroid); TAKE 1 TABLET DAILY    Discussion/Summary      -- anxiety: overall much improved since starting Lexapro 10 mg daily ( patient no longer on BuSpar)  patient currently has 1month-old son and is not breast feeding  will refill med today  will maintain for at least another 6 months and consider taper at that time  History of thyroid nodule/ hypothyroid: patient is still seeing Endocrinology  Has appointment in February  Doing well on levothyroxine 25 mcg daily  Med refill today menorrhagia: patient is been having ongoing spotting  her gynecologist is following her for this  May be starting oral contraceptive in near future  I gave her an Rx to have a CBC done along with her next blood draw from endocrine     6 months  Possible side effects of new medications were reviewed with the patient/guardian today  The treatment plan was reviewed with the patient/guardian  The patient/guardian understands and agrees with the treatment plan      Chief Complaint   Pt presents for her 6 week follow up  Patient is here today for follow up of chronic conditions described in HPI  History of Present Illness   Patient presents for med check appointment today  Overall she is much improved since starting Lexapro  No side effects  Patient still seeing Endocrinology regarding thyroid cyst  Next appointment in February  Doing reasonably well on levothyroxine 25 mcg daily  Patient still having menstrual spotting   She has been seeing her gynecologist for this to maybe starting oral contrac      Review of Systems        Constitutional: No fever, no chills, feels well, no tiredness, no recent weight gain or weight loss  Cardiovascular: No complaints of slow heart rate, no fast heart rate, no chest pain, no palpitations, no leg claudication, no lower extremity edema  Respiratory: No complaints of shortness of breath, no wheezing, no cough, no SOB on exertion, no orthopnea, no PND  Gastrointestinal: No complaints of abdominal pain, no constipation, no nausea or vomiting, no diarrhea, no bloody stools  Genitourinary: No complaints of dysuria, no incontinence, no pelvic pain, no dysmenorrhea, no vaginal discharge or bleeding  Active Problems   1  Adjustment disorder with mixed anxiety and depressed mood (309 28) (F43 23)   2  Amenorrhea (626 0) (N91 2)   3  Anxiety (300 00) (F41 9)   4  Arthralgia of multiple joints (719 49) (M25 50)   5  Cervical cancer screening (V76 2) (Z12 4)   6  Elderly multigravida in second trimester (659 63) (O09 522)   7  Female fertility problem (628 9) (N97 9)   8  MARIANO (generalized anxiety disorder) (300 02) (F41 1)   9  History of  delivery, currently pregnant in second trimester (V23 41) (O09 212)   10  MDD (major depressive disorder), recurrent episode, mild (296 31) (F33 0)   11  Pregnancy (V22 2) (Z34 90)   12  Synovial cyst of left popliteal space (727 51) (M71 22)   13  Thyroid nodule (241 0) (E04 1)   14  Vitamin D deficiency (268 9) (E55 9)    Past Medical History   1  History of Abdominal pain, RLQ (right lower quadrant) (789 03) (R10 31)   2  History of Acute nonintractable headache, unspecified headache type (784 0) (R51)   3  History of Atypical lymphocytosis (288 61) (D72 820)   4  History of Baker cyst (727 51) (M71 20)   5  History of Chronic fatigue (780 79) (R53 82)   6  History of Elevated LFTs (790 6) (R79 89)   7  History of Flu vaccine need (V04 81) (Z23)   8  History of Flu-like symptoms (780 99) (R68 89)   9  History of abdominal pain (V13 89) (Z87 898)   10  History of backache (V13 59) (Z87 39)   11   History of fatigue (V13 89) (Z87 898)   12  Denied: History of head injury   13  History of hypothyroidism (V12 29) (Z86 39)   14  History of shortness of breath (V13 89) (Z87 898)   15  History of streptococcal pharyngitis (V12 09) (Z87 09)   16  History of urinary tract infection (V13 02) (Z87 440)   17  History of Knee instability, left (718 86) (M25 362)   18  History of Knee pain (719 46) (M25 569)   19  History of Localized swelling, mass, or lump of left lower extremity (782 2) (R22 42)   20  History of Mononucleosis (075) (B27 90)   21  History of Neck pain (723 1) (M54 2)   22  History of Need for immunization against influenza (V04 81) (Z23)   23  History of Night sweats (780 8) (R61)   24  History of Numbness in left leg (782 0) (R20 0)   25  History of Numbness of toes (782 0) (R20 0)   26  History of Right ear pain (388 70) (H92 01)   27  History of Screening for depression (V79 0) (Z13 89)   28  Denied: History of Seizures   29  History of Sore throat (462) (J02 9)   30  Vaginal delivery (V13 29) (Z87 42)     The active problems and past medical history were reviewed and updated today  Surgical History   1  History of Dilation And Curettage   2  History of Ovarian Cystectomy   3  History of Primary Repair Of Knee Ligament Cruciate Anterior    Family History   Mother    1  Family history of diabetes mellitus (V18 0) (Z83 3)   2  Family history of thyroid disease (V18 19) (Z83 49)   3  No family history of mental disorder  Father    4  No family history of mental disorder  Sister    5  Family history of thyroid disease (V18 19) (Z83 49)    Social History    · Denied: History of Drug use   · Never a smoker   · No alcohol use  The social history was reviewed and updated today  The social history was reviewed and is unchanged  Current Meds    1  Aspirin 81 MG CAPS; Therapy: (Recorded:05Ibx0279) to Recorded   2  Escitalopram Oxalate 10 MG Oral Tablet; Take 1 tablet daily;      Therapy: 90XQN2372 to (Evaluate:58Cxx4895)  Requested for: 15KTM0762; Last     Rx:67Kmq7218 Ordered   3  Fish Oil CAPS; Therapy: (Recorded:04Mtf4304) to Recorded   4  Levothyroxine Sodium 25 MCG Oral Tablet; TAKE 1 TABLET DAILY  Requested for:     76SRY6349; Last Rx:25Tkb7317 Ordered   5  Vitamin D 1000 UNIT Oral Tablet; TAKE 1 TABLET DAILY; Therapy: (Recorded:60Lge5496) to Recorded     The medication list was reviewed and updated today  Allergies   1  No Known Drug Allergies  2  No Known Environmental Allergies   3  No Known Food Allergies    Vitals   Vital Signs    Recorded: 24XOK1032 08:58AM   Temperature 97 5 F, Tympanic   Heart Rate 78   Respiration Quality Normal   Respiration 16   Systolic 356, LUE, Sitting   Diastolic 90, LUE, Sitting   Weight 128 lb 1 oz   BMI Calculated 22 69   BSA Calculated 1 6   O2 Saturation 99, RA   Pain Scale 0     Health Management   Cervical cancer screening   (1) THIN PREP PAP WITH IMAGING; every 5 years; Last 90CPZ8306; Next Due: 99Uhj6228; Active    Future Appointments      Date/Time Provider Specialty Site   02/20/2018 05:00 PM Jony Teixeira, [facility] Endocrinology [address] [facility] ENDOCRINOLOGY     Signatures    Electronically signed by :  Vann Lanes, DO; Jan 18 2018  9:23AM EST                       (Author)

## 2018-01-23 VITALS
HEIGHT: 63 IN | OXYGEN SATURATION: 97 % | SYSTOLIC BLOOD PRESSURE: 126 MMHG | RESPIRATION RATE: 16 BRPM | OXYGEN SATURATION: 99 % | TEMPERATURE: 97.5 F | WEIGHT: 131.56 LBS | RESPIRATION RATE: 16 BRPM | HEART RATE: 62 BPM | DIASTOLIC BLOOD PRESSURE: 84 MMHG | DIASTOLIC BLOOD PRESSURE: 90 MMHG | TEMPERATURE: 98.2 F | BODY MASS INDEX: 23.31 KG/M2 | WEIGHT: 128.06 LBS | SYSTOLIC BLOOD PRESSURE: 132 MMHG | HEART RATE: 78 BPM | BODY MASS INDEX: 22.68 KG/M2

## 2018-01-23 NOTE — PSYCH
Message  Message Free Text Note Form: Pt was Discharged from Psychotherapy ,(per her request and she had CX beatriz'ts ) on 18  Discharge Summary was completed 18 -MT      Active Problems    1  Adjustment disorder with mixed anxiety and depressed mood (309 28) (F43 23)   2  Amenorrhea (626 0) (N91 2)   3  Anxiety (300 00) (F41 9)   4  Arthralgia of multiple joints (719 49) (M25 50)   5  Cervical cancer screening (V76 2) (Z12 4)   6  Elderly multigravida in second trimester (659 63) (O09 522)   7  Female fertility problem (628 9) (N97 9)   8  MARIANO (generalized anxiety disorder) (300 02) (F41 1)   9  History of  delivery, currently pregnant in second trimester (V23 41) (O09 212)   10  MDD (major depressive disorder), recurrent episode, mild (296 31) (F33 0)   11  Menorrhagia (626 2) (N92 0)   12  Pregnancy (V22 2) (Z34 90)   13  Synovial cyst of left popliteal space (727 51) (M71 22)   14  Thyroid nodule (241 0) (E04 1)   15  Vitamin D deficiency (268 9) (E55 9)    Current Meds   1  Aspirin 81 MG CAPS; Therapy: (Recorded:55Mut6033) to Recorded   2  Escitalopram Oxalate 10 MG Oral Tablet; Take 1 tablet daily; Therapy: 11MFI7777 to (Last Rx:2018)  Requested for: 19YZY2790 Ordered   3  Fish Oil CAPS; Therapy: (Recorded:25Dfb8728) to Recorded   4  Levothyroxine Sodium 25 MCG Oral Tablet (Synthroid); TAKE 1 TABLET DAILY    Requested for: 31XMQ7284; Last Rx:2018 Ordered   5  Vitamin D 1000 UNIT Oral Tablet; TAKE 1 TABLET DAILY; Therapy: (Recorded:72Fvh7895) to Recorded    Allergies    1  No Known Drug Allergies    2  No Known Environmental Allergies   3   No Known Food Allergies    Signatures   Electronically signed by : Alyce Winters MSAPRNPMHCNS-BC; 2018  4:35PM EST                       (Author)

## 2018-01-23 NOTE — MISCELLANEOUS
Message  This Therapist returned pt's call at [phone],JKC notified her that she has already been referred to the Psychiatrist Scheduling staff for Medication Eval/ Management  I also gave her their phone number, which is [phone]  Gave her support on the phone,and she is appreciative of the call back  Basilio Butler, MS,APRN      Active Problems    1  Adjustment disorder with mixed anxiety and depressed mood (309 28) (F43 23)   2  Amenorrhea (626 0) (N91 2)   3  Arthralgia of multiple joints (719 49) (M25 50)   4  Elderly multigravida in second trimester (659 63) (O09 522)   5  Female fertility problem (628 9) (N97 9)   6  MARIANO (generalized anxiety disorder) (300 02) (F41 1)   7  History of  delivery, currently pregnant in second trimester (V23 41) (O09 212)   8  MDD (major depressive disorder), recurrent episode, mild (296 31) (F33 0)   9  Pregnancy (V22 2) (Z34 90)   10  Synovial cyst of left popliteal space (727 51) (M71 22)   11  Thyroid nodule (241 0) (E04 1)   12  Vitamin D deficiency (268 9) (E55 9)    Current Meds   1  Aspirin 81 MG CAPS; Therapy: (Recorded:41Lje6035) to Recorded   2  Fish Oil CAPS; Therapy: (Recorded:18Amj1436) to Recorded   3  Multi Prenatal TABS; TAKE 1 TABLET DAILY AS DIRECTED; Therapy: (Recorded:77Iww7762) to Recorded   4  Synthroid 25 MCG Oral Tablet (Levothyroxine Sodium); TAKE 1 TABLET DAILY; Therapy: (Recorded:58Sbs3969) to Recorded   5  Vitamin D 1000 UNIT Oral Tablet; TAKE 1 TABLET DAILY; Therapy: (Recorded:34Xqm1207) to Recorded    Allergies    1  No Known Drug Allergies    2  No Known Environmental Allergies   3   No Known Food Allergies    Signatures   Electronically signed by : Alyce Winters MSAPRNPMHCNMEME; Dec  5 2017 11:21AM EST                       (Author)

## 2018-02-20 ENCOUNTER — OFFICE VISIT (OUTPATIENT)
Dept: ENDOCRINOLOGY | Facility: CLINIC | Age: 38
End: 2018-02-20
Payer: COMMERCIAL

## 2018-02-20 VITALS
WEIGHT: 129 LBS | DIASTOLIC BLOOD PRESSURE: 68 MMHG | SYSTOLIC BLOOD PRESSURE: 118 MMHG | HEART RATE: 62 BPM | HEIGHT: 63 IN | BODY MASS INDEX: 22.86 KG/M2

## 2018-02-20 DIAGNOSIS — E04.1 THYROID NODULE: Primary | ICD-10-CM

## 2018-02-20 DIAGNOSIS — E55.9 VITAMIN D DEFICIENCY: ICD-10-CM

## 2018-02-20 DIAGNOSIS — E03.9 HYPOTHYROIDISM, UNSPECIFIED TYPE: ICD-10-CM

## 2018-02-20 PROBLEM — O99.119 GESTATIONAL THROMBOCYTOPENIA (HCC): Status: RESOLVED | Noted: 2017-10-11 | Resolved: 2018-02-20

## 2018-02-20 PROBLEM — D69.6 GESTATIONAL THROMBOCYTOPENIA (HCC): Status: RESOLVED | Noted: 2017-10-11 | Resolved: 2018-02-20

## 2018-02-20 PROBLEM — Z3A.36 PREGNANCY WITH 36 COMPLETED WEEKS GESTATION: Status: RESOLVED | Noted: 2017-10-11 | Resolved: 2018-02-20

## 2018-02-20 PROBLEM — O42.019 PREMATURE RUPTURE OF MEMBRANES (PROM), ONSET OF LABOR WITHIN 24 HOURS, ANTEPARTUM, PRETERM: Status: RESOLVED | Noted: 2017-10-11 | Resolved: 2018-02-20

## 2018-02-20 PROCEDURE — 99214 OFFICE O/P EST MOD 30 MIN: CPT | Performed by: PHYSICIAN ASSISTANT

## 2018-02-20 RX ORDER — ESCITALOPRAM OXALATE 10 MG/1
1 TABLET ORAL DAILY
COMMUNITY
Start: 2017-12-06 | End: 2018-07-05 | Stop reason: SDUPTHER

## 2018-02-20 NOTE — PROGRESS NOTES
Established Patient Progress Note       No chief complaint on file  History of Present Illness:     Suzette Allen is a 30 y o  female with a history of Thyroid nodule, hypothyroidism, and Vitamin D Deficiency  For the hypothyroidism she is taking levothyroxine 25 mcg daily  She is feeling well with no symptoms of hypothyroidism or hyperthyroidism  She delivered her baby approximately 4 months ago  Thyroid function testing from January 11, 2018 was normal   She has had some problems with bleeding post partum  After previous pregnancy she was able to come off her levothyroxine  Levothyroxine was initially started during treatment for infertility  For the thyroid nodule, she had ultrasound in 2017 which was stable  Biopsy in 2016 showed no malignancy  (performed x 2 due to difficult calcified nodule)  She does have occasional discomfort when she swallows and metallic taste in her mouth        For vitamin-D deficiency, she is taking supplements      Patient Active Problem List   Diagnosis    Thyroid nodule    Vitamin D deficiency    Hypothyroidism      Past Medical History:   Diagnosis Date    Disease of thyroid gland     Female infertility       Past Surgical History:   Procedure Laterality Date    COLPOSCOPY      KNEE ARTHROSCOPY W/ ACL RECONSTRUCTION      right knee    OVARIAN CYST REMOVAL      WISDOM TOOTH EXTRACTION        Family History   Problem Relation Age of Onset    Thyroid disease unspecified Mother     Prostate cancer Father     Thyroid disease unspecified Sister     Diabetes unspecified Maternal Uncle     Cancer Maternal Uncle     Diabetes type II Maternal Grandmother     Diabetes unspecified Maternal Grandfather      Social History   Substance Use Topics    Smoking status: Never Smoker    Smokeless tobacco: Never Used    Alcohol use No     No Known Allergies    Current Outpatient Prescriptions:     aspirin 81 MG tablet, Take by mouth, Disp: , Rfl:    CHOLECALCIFEROL PO, Take 1,000 Units by mouth, Disp: , Rfl:     docusate sodium (COLACE) 100 mg capsule, Take 1 capsule by mouth 2 (two) times a day, Disp: 10 capsule, Rfl: 0    escitalopram (LEXAPRO) 10 mg tablet, Take 1 tablet by mouth daily, Disp: , Rfl:     levothyroxine 50 mcg tablet, Take 50 mcg by mouth, Disp: , Rfl:     Omega-3 Fatty Acids (FISH OIL) 645 MG CAPS, Take by mouth, Disp: , Rfl:     acetaminophen (TYLENOL) 325 mg tablet, Headache, Disp: 30 tablet, Rfl: 0    benzocaine-menthol-lanolin-aloe (DERMOPLAST) 20-0 5 % topical spray, Apply 1 application topically 4 (four) times a day as needed for mild pain, Disp: , Rfl: 0    ibuprofen (MOTRIN) 600 mg tablet, Take 1 tablet by mouth every 4 (four) hours as needed for mild pain, Disp: 30 tablet, Rfl: 0    IRON PO, Take 1 tablet by mouth, Disp: , Rfl:     witch hazel-glycerin (TUCKS) topical pad, Apply 1 pad topically as needed for irritation, Disp: 40 each, Rfl: 0    Review of Systems   Constitutional: Negative for activity change, appetite change, chills, diaphoresis, fatigue, fever and unexpected weight change  HENT: Negative for trouble swallowing and voice change  Eyes: Negative for visual disturbance  Respiratory: Negative for shortness of breath  Cardiovascular: Negative for chest pain and palpitations  Gastrointestinal: Negative for abdominal pain, constipation and diarrhea  Endocrine: Negative for cold intolerance, heat intolerance, polydipsia, polyphagia and polyuria  Genitourinary: Negative for frequency and menstrual problem  Musculoskeletal: Negative for arthralgias and myalgias  Skin: Negative for rash  Allergic/Immunologic: Negative for food allergies  Neurological: Negative for dizziness and tremors  Hematological: Negative for adenopathy  Psychiatric/Behavioral: Negative for sleep disturbance  All other systems reviewed and are negative  Physical Exam:  Body mass index is 22 85 kg/m²    /68 Pulse 62   Ht 5' 3" (1 6 m)   Wt 58 5 kg (129 lb)   BMI 22 85 kg/m²    Wt Readings from Last 3 Encounters:   02/20/18 58 5 kg (129 lb)   01/18/18 58 1 kg (128 lb 0 9 oz)   12/06/17 59 7 kg (131 lb 8 9 oz)       Physical Exam   Constitutional: She is oriented to person, place, and time  She appears well-developed and well-nourished  No distress  HENT:   Head: Normocephalic and atraumatic  Eyes: Conjunctivae are normal  Pupils are equal, round, and reactive to light  Neck: Normal range of motion  Neck supple  Thyromegaly (palpable right sided nodule) present  Cardiovascular: Normal rate, regular rhythm and normal heart sounds  Pulmonary/Chest: Effort normal and breath sounds normal  No respiratory distress  She has no wheezes  She has no rales  Abdominal: Soft  Bowel sounds are normal  She exhibits no distension  There is no tenderness  Musculoskeletal: Normal range of motion  She exhibits no edema  Neurological: She is alert and oriented to person, place, and time  Skin: Skin is warm and dry  Psychiatric: She has a normal mood and affect  Vitals reviewed  Labs:       Lab Results   Component Value Date    CREATININE 0 84 12/24/2015    CREATININE 0 90 04/22/2015    CREATININE 0 77 05/15/2014    BUN 14 12/24/2015     12/24/2015    K 4 3 12/24/2015     12/24/2015    CO2 28 12/24/2015     No results found for: EGFR    Lab Results   Component Value Date    CHOL 155 03/29/2013    HDL 72 03/29/2013    TRIG 33 03/29/2013       Lab Results   Component Value Date    ALT 34 12/24/2015    AST 21 12/24/2015    ALKPHOS 47 12/24/2015    BILITOT 1 21 (H) 12/24/2015       Lab Results   Component Value Date    FREET4 1 00 12/24/2015         Impression & Plan:    Problem List Items Addressed This Visit     Thyroid nodule - Primary     Stable on ultrasound from 6/2017  Repeat ultrasound this summer  FNA in 2016 showed no malignancy           Relevant Orders    TSH, 3rd generation    T4, free US thyroid    Vitamin D deficiency     Continue supplements  Hypothyroidism     Recent Lab testing from 1/11/2018 was normal with TSH of 0 61 and Total T4 of 5 9  Continue levothyroxine at current dose and repeat testing in 6 months  Orders Placed This Encounter   Procedures    US thyroid     Standing Status:   Future     Standing Expiration Date:   2/20/2019     Scheduling Instructions:      No prep required  Please bring your physician order, insurance cards, a form of photo ID and a list of your medications with you  Arrive 15 minutes prior to your appointment time in order to register  Order Specific Question:   Reason for Exam:     Answer:   thyroid nodule     Order Specific Question:   Is the patient pregnant? Answer:   No    TSH, 3rd generation     This is a patient instruction: This test is non-fasting  Please drink two glasses of water morning of bloodwork  Standing Status:   Future     Standing Expiration Date:   2/20/2019    T4, free     Standing Status:   Future     Standing Expiration Date:   2/20/2019       Patient Instructions   Complete labs and ultrasound in the summer          Discussed with the patient and all questioned fully answered  She will call me if any problems arise  Follow-up appointment in 12 months       Counseled patient on diagnostic results, prognosis, risk and benefit of treatment options, instruction for management, importance of treatment compliance, Risk  factor reduction and impressions      Brian Laws PA-C

## 2018-02-20 NOTE — ASSESSMENT & PLAN NOTE
Recent Lab testing from 1/11/2018 was normal with TSH of 0 61 and Total T4 of 5 9  Continue levothyroxine at current dose and repeat testing in 6 months

## 2018-03-07 NOTE — PSYCH
History of Present Illness    Discharge Summary: Admission Date: 11/28/17    Patient was referred by Primary care and Integrated therapist  Discharge Date: 1/ 16 /18  This was a routine discharge  Pt cancelled Follow-up beatriz'ts  because she told Support Staff that she obtained Psychiatry appt's at her former Psychiatric Provider  Discharge Diagnosis:    Axis I: Major Depressive Disorder, recurrent, mild, F33 0; Generalized Anxiety Disorder, F41 10   Axis II: Deferred  Axis III: Thyroid cyst; Left Knee Cyst; decreased dexterity in hands, (reportedly, from pt )   Axis IV: Stressors moderate: Pt is now about 3 months Post-partum; family stressors; Past miscarriages x2  Axis V: 61    Treating Physician: Dr Aimee Pineda and Dearl Madison Butler, MS,APRN, PMHCNS/ Therapist    Treatment Complications: None  Admit: 54  Discharge: 60    Prognosis at time of discharge is good  Presenting Problem/Pertinent findings:  Pt presented with mildly depressed mood,and moderately anxious affect  She was 7 weeks post-partum at the time of first Therapy beatriz't  She denied any SI /HI/AH/VH  Course of treatment includes  individual counseling, cognitive behavioral, psychiatric evaluation, medication management and eclectic  Treatment Progress: Pt participated in one Evaluation session with Psychiatrist,and one Therapy session with this Therapist  She was given Active Listening, Support, Validation as a person, some Grief Therapy re: her past miscarriages/ losses,and brief psychoeducation  We Cognitively reviewed her Strengths  Reviewed Calm, Deep-breathing/ calm Visualization exercises,and some anger-management skills  Pt was going to utilize these at home  She called back , when follow-up beatriz'ts had been made,and said she had made beatriz'ts with her previous Psychiatric Provider---she cancelled further beatriz'ts  here at [facility]  The consumer achieved some of their goals  Criteria for Discharge:  The patient has   Pt has a different provider outside of [facility] (her previous Provider,she told Support Staff), so she cancelled beatriz'ts here  Aftercare recommendations include:  Continue any meds  and psychotherapy as recommended by her Providers  Discharge Medications include: (See Electronic Medical Record  This Psychotherapist does not prescribe meds )         Prognosis: Pt [place]) has the ability and willingness to solve her problems  Active Problems    1  Adjustment disorder with mixed anxiety and depressed mood (309 28) (F43 23)   2  Amenorrhea (626 0) (N91 2)   3  Anxiety (300 00) (F41 9)   4  Arthralgia of multiple joints (719 49) (M25 50)   5  Cervical cancer screening (V76 2) (Z12 4)   6  Elderly multigravida in second trimester (659 63) (O09 522)   7  Female fertility problem (628 9) (N97 9)   8  MARIANO (generalized anxiety disorder) (300 02) (F41 1)   9  History of  delivery, currently pregnant in second trimester (V23 41) (O09 212)   10  MDD (major depressive disorder), recurrent episode, mild (296 31) (F33 0)   11  Pregnancy (V22 2) (Z34 90)   12  Synovial cyst of left popliteal space (727 51) (M71 22)   13  Thyroid nodule (241 0) (E04 1)   14  Vitamin D deficiency (268 9) (E55 9)    Past Medical History    1  History of Abdominal pain, RLQ (right lower quadrant) (789 03) (R10 31)   2  History of Acute nonintractable headache, unspecified headache type (784 0) (R51)   3  History of Atypical lymphocytosis (288 61) (D72 820)   4  History of Baker cyst (727 51) (M71 20)   5  History of Chronic fatigue (780 79) (R53 82)   6  History of Elevated LFTs (790 6) (R79 89)   7  History of Flu vaccine need (V04 81) (Z23)   8  History of Flu-like symptoms (780 99) (R68 89)   9  History of abdominal pain (V13 89) (Z87 898)   10  History of backache (V13 59) (Z87 39)   11  History of fatigue (V13 89) (Z87 898)   12  Denied: History of head injury   13   History of hypothyroidism (V12 29) (Z86 23) 14  History of shortness of breath (V13 89) (Z87 898)   15  History of streptococcal pharyngitis (V12 09) (Z87 09)   16  History of urinary tract infection (V13 02) (Z87 440)   17  History of Knee instability, left (718 86) (M25 362)   18  History of Knee pain (719 46) (M25 569)   19  History of Localized swelling, mass, or lump of left lower extremity (782 2) (R22 42)   20  History of Mononucleosis (075) (B27 90)   21  History of Neck pain (723 1) (M54 2)   22  History of Need for immunization against influenza (V04 81) (Z23)   23  History of Night sweats (780 8) (R61)   24  History of Numbness in left leg (782 0) (R20 0)   25  History of Numbness of toes (782 0) (R20 0)   26  History of Right ear pain (388 70) (H92 01)   27  History of Screening for depression (V79 0) (Z13 89)   28  Denied: History of Seizures   29  History of Sore throat (462) (J02 9)   30  Vaginal delivery (V13 29) (Z87 42)    Social History    · Denied: History of Drug use   · Never a smoker   · No alcohol use    Allergies    1  No Known Drug Allergies    2  No Known Environmental Allergies   3  No Known Food Allergies    Current Meds   1  Aspirin 81 MG CAPS; Therapy: (Recorded:[id number]) to Recorded   2  Escitalopram Oxalate 10 MG Oral Tablet; Take 1 tablet daily; Therapy: [id number] to (Evaluate:67Yym4371)  Requested for: 80UET4842; Last   Rx:[id number] Ordered   3  Fish Oil CAPS; Therapy: (Recorded:62Sub3731) to Recorded   4  Levothyroxine Sodium 25 MCG Oral Tablet (Synthroid); TAKE 1 TABLET DAILY    Requested for: 80AFA7794; Last Rx:63Itn9712 Ordered   5  Vitamin D 1000 UNIT Oral Tablet; TAKE 1 TABLET DAILY; Therapy: (Recorded:83Jms5852) to Recorded    The medication list was reviewed and updated today         Future Appointments    Date/Time Provider Specialty Site   01/18/2018 08:45 AM Lynn Dyer [address]   02/20/2018 05:00 PM Alida Romero [facility] Signatures   Electronically signed by : CHINO EagleMHCNS-BC; Jan 16 2018  2:23PM EST                       (Author)    Electronically signed by : SUSHIL Eagle; Jan 16 2018  2:23PM EST                       (Author)    Electronically signed by : NORAH Riojas ; Jan 16 2018  2:28PM EST                       (Acknowledgement)

## 2018-05-27 DIAGNOSIS — E03.9 HYPOTHYROIDISM, UNSPECIFIED TYPE: Primary | ICD-10-CM

## 2018-05-27 RX ORDER — LEVOTHYROXINE SODIUM 0.03 MG/1
TABLET ORAL
Qty: 90 TABLET | Refills: 1 | Status: SHIPPED | OUTPATIENT
Start: 2018-05-27 | End: 2018-12-02 | Stop reason: SDUPTHER

## 2018-07-05 DIAGNOSIS — F32.A DEPRESSION, UNSPECIFIED DEPRESSION TYPE: Primary | ICD-10-CM

## 2018-07-05 RX ORDER — ESCITALOPRAM OXALATE 10 MG/1
TABLET ORAL
Qty: 90 TABLET | Refills: 1 | Status: SHIPPED | OUTPATIENT
Start: 2018-07-05 | End: 2019-01-01 | Stop reason: SDUPTHER

## 2018-09-24 ENCOUNTER — OFFICE VISIT (OUTPATIENT)
Dept: FAMILY MEDICINE CLINIC | Facility: CLINIC | Age: 38
End: 2018-09-24
Payer: COMMERCIAL

## 2018-09-24 VITALS
OXYGEN SATURATION: 98 % | HEART RATE: 55 BPM | HEIGHT: 63 IN | BODY MASS INDEX: 23.57 KG/M2 | TEMPERATURE: 98.8 F | WEIGHT: 133 LBS | SYSTOLIC BLOOD PRESSURE: 100 MMHG | RESPIRATION RATE: 16 BRPM | DIASTOLIC BLOOD PRESSURE: 70 MMHG

## 2018-09-24 DIAGNOSIS — R51.9 HEADACHE, UNSPECIFIED HEADACHE TYPE: ICD-10-CM

## 2018-09-24 DIAGNOSIS — M25.50 MULTIPLE JOINT PAIN: ICD-10-CM

## 2018-09-24 DIAGNOSIS — G25.81 RESTLESS LEGS SYNDROME: Primary | ICD-10-CM

## 2018-09-24 DIAGNOSIS — Z23 NEED FOR INFLUENZA VACCINATION: ICD-10-CM

## 2018-09-24 DIAGNOSIS — R53.83 FATIGUE, UNSPECIFIED TYPE: ICD-10-CM

## 2018-09-24 LAB
ALBUMIN SERPL BCP-MCNC: 3.7 G/DL (ref 3.5–5)
ALP SERPL-CCNC: 44 U/L (ref 46–116)
ALT SERPL W P-5'-P-CCNC: 27 U/L (ref 12–78)
ANION GAP SERPL CALCULATED.3IONS-SCNC: 5 MMOL/L (ref 4–13)
AST SERPL W P-5'-P-CCNC: 29 U/L (ref 5–45)
BASOPHILS # BLD AUTO: 0.03 THOUSANDS/ΜL (ref 0–0.1)
BASOPHILS NFR BLD AUTO: 1 % (ref 0–1)
BILIRUB SERPL-MCNC: 0.63 MG/DL (ref 0.2–1)
BUN SERPL-MCNC: 14 MG/DL (ref 5–25)
CALCIUM SERPL-MCNC: 8.4 MG/DL (ref 8.3–10.1)
CHLORIDE SERPL-SCNC: 105 MMOL/L (ref 100–108)
CO2 SERPL-SCNC: 29 MMOL/L (ref 21–32)
CREAT SERPL-MCNC: 0.9 MG/DL (ref 0.6–1.3)
CRP SERPL QL: <3 MG/L
EOSINOPHIL # BLD AUTO: 0.06 THOUSAND/ΜL (ref 0–0.61)
EOSINOPHIL NFR BLD AUTO: 1 % (ref 0–6)
ERYTHROCYTE [DISTWIDTH] IN BLOOD BY AUTOMATED COUNT: 12.6 % (ref 11.6–15.1)
ERYTHROCYTE [SEDIMENTATION RATE] IN BLOOD: 2 MM/HOUR (ref 0–20)
GFR SERPL CREATININE-BSD FRML MDRD: 83 ML/MIN/1.73SQ M
GLUCOSE SERPL-MCNC: 109 MG/DL (ref 65–140)
HCT VFR BLD AUTO: 42.6 % (ref 34.8–46.1)
HGB BLD-MCNC: 14.3 G/DL (ref 11.5–15.4)
IMM GRANULOCYTES # BLD AUTO: 0.01 THOUSAND/UL (ref 0–0.2)
IMM GRANULOCYTES NFR BLD AUTO: 0 % (ref 0–2)
LYMPHOCYTES # BLD AUTO: 1.8 THOUSANDS/ΜL (ref 0.6–4.47)
LYMPHOCYTES NFR BLD AUTO: 30 % (ref 14–44)
MCH RBC QN AUTO: 32.4 PG (ref 26.8–34.3)
MCHC RBC AUTO-ENTMCNC: 33.6 G/DL (ref 31.4–37.4)
MCV RBC AUTO: 96 FL (ref 82–98)
MONOCYTES # BLD AUTO: 0.46 THOUSAND/ΜL (ref 0.17–1.22)
MONOCYTES NFR BLD AUTO: 8 % (ref 4–12)
NEUTROPHILS # BLD AUTO: 3.64 THOUSANDS/ΜL (ref 1.85–7.62)
NEUTS SEG NFR BLD AUTO: 60 % (ref 43–75)
NRBC BLD AUTO-RTO: 0 /100 WBCS
PLATELET # BLD AUTO: 152 THOUSANDS/UL (ref 149–390)
PMV BLD AUTO: 13.1 FL (ref 8.9–12.7)
POTASSIUM SERPL-SCNC: 3.9 MMOL/L (ref 3.5–5.3)
PROT SERPL-MCNC: 6.4 G/DL (ref 6.4–8.2)
RBC # BLD AUTO: 4.42 MILLION/UL (ref 3.81–5.12)
SODIUM SERPL-SCNC: 139 MMOL/L (ref 136–145)
TSH SERPL DL<=0.05 MIU/L-ACNC: 0.66 UIU/ML (ref 0.36–3.74)
URATE SERPL-MCNC: 3.2 MG/DL (ref 2–6.8)
WBC # BLD AUTO: 6 THOUSAND/UL (ref 4.31–10.16)

## 2018-09-24 PROCEDURE — 36415 COLL VENOUS BLD VENIPUNCTURE: CPT | Performed by: PHYSICIAN ASSISTANT

## 2018-09-24 PROCEDURE — 84443 ASSAY THYROID STIM HORMONE: CPT | Performed by: PHYSICIAN ASSISTANT

## 2018-09-24 PROCEDURE — 90471 IMMUNIZATION ADMIN: CPT | Performed by: PHYSICIAN ASSISTANT

## 2018-09-24 PROCEDURE — 85652 RBC SED RATE AUTOMATED: CPT | Performed by: PHYSICIAN ASSISTANT

## 2018-09-24 PROCEDURE — 1036F TOBACCO NON-USER: CPT | Performed by: PHYSICIAN ASSISTANT

## 2018-09-24 PROCEDURE — 90682 RIV4 VACC RECOMBINANT DNA IM: CPT | Performed by: PHYSICIAN ASSISTANT

## 2018-09-24 PROCEDURE — 99214 OFFICE O/P EST MOD 30 MIN: CPT | Performed by: PHYSICIAN ASSISTANT

## 2018-09-24 PROCEDURE — 84550 ASSAY OF BLOOD/URIC ACID: CPT | Performed by: PHYSICIAN ASSISTANT

## 2018-09-24 PROCEDURE — 86617 LYME DISEASE ANTIBODY: CPT | Performed by: PHYSICIAN ASSISTANT

## 2018-09-24 PROCEDURE — 86618 LYME DISEASE ANTIBODY: CPT | Performed by: PHYSICIAN ASSISTANT

## 2018-09-24 PROCEDURE — 80053 COMPREHEN METABOLIC PANEL: CPT | Performed by: PHYSICIAN ASSISTANT

## 2018-09-24 PROCEDURE — 85025 COMPLETE CBC W/AUTO DIFF WBC: CPT | Performed by: PHYSICIAN ASSISTANT

## 2018-09-24 PROCEDURE — 86430 RHEUMATOID FACTOR TEST QUAL: CPT | Performed by: PHYSICIAN ASSISTANT

## 2018-09-24 PROCEDURE — 86038 ANTINUCLEAR ANTIBODIES: CPT | Performed by: PHYSICIAN ASSISTANT

## 2018-09-24 PROCEDURE — 86140 C-REACTIVE PROTEIN: CPT | Performed by: PHYSICIAN ASSISTANT

## 2018-09-24 RX ORDER — PRAMIPEXOLE DIHYDROCHLORIDE 0.12 MG/1
0.12 TABLET ORAL
Qty: 30 TABLET | Refills: 0 | Status: SHIPPED | OUTPATIENT
Start: 2018-09-24 | End: 2019-10-09

## 2018-09-24 NOTE — PROGRESS NOTES
Assessment/Plan:         Diagnoses and all orders for this visit:    Restless legs syndrome  -     pramipexole (MIRAPEX) 0 125 mg tablet; Take 1 tablet (0 125 mg total) by mouth daily at bedtime    Multiple joint pain  -     CBC and differential  -     Comprehensive metabolic panel  -     TSH, 3rd generation with Free T4 reflex  -     Sedimentation rate, automated  -     RF Screen w/ Reflex to Titer  -     C-reactive protein  -     ANGEL Screen w/ Reflex to Titer/Pattern  -     Lyme Antibody Profile with reflex to WB  -     Uric acid  -     Lyme disease, western blot; Future  -     Lyme disease, western blot    Fatigue, unspecified type  -     CBC and differential  -     Comprehensive metabolic panel  -     TSH, 3rd generation with Free T4 reflex  -     Sedimentation rate, automated  -     RF Screen w/ Reflex to Titer  -     C-reactive protein  -     ANGEL Screen w/ Reflex to Titer/Pattern  -     Lyme Antibody Profile with reflex to WB  -     Uric acid  -     Lyme disease, western blot; Future  -     Lyme disease, western blot    Headache, unspecified headache type  -     CBC and differential  -     Comprehensive metabolic panel  -     TSH, 3rd generation with Free T4 reflex  -     Sedimentation rate, automated  -     RF Screen w/ Reflex to Titer  -     C-reactive protein  -     ANGEL Screen w/ Reflex to Titer/Pattern  -     Lyme Antibody Profile with reflex to WB  -     Uric acid  -     Lyme disease, western blot; Future  -     Lyme disease, western blot    Need for influenza vaccination  -     influenza vaccine, [id number], quadrivalent, recombinant, PF, 0 5 mL, for patients 18-49 yr with comorbidities (FLUBLOK)      Discussed pt's symptoms with her today in detail  We discussed treatment options for her restless legs syndrome and agreed to start her on a trial of Mirapex  She may double the starting dose after one week if not significantly improved   If not improved overall within the next few weeks, then she is to call to discuss further  Regarding her fatigue, joint pain, and headaches, I will further evaluate with BW including CBC, CMP, and an autoimmune/arthritis panel as well as Lyme test and call with results once obtained  She will also be given a flu shot today  To call or F/U ASAP should sx's change/worsen  Chief Complaint   Patient presents with    joints pain     x 2 weeks/tired/flu shot       Subjective:      Patient ID: Rose Pineda is a 41 y o  female  Pt presents with a 2 week history of diffuse/multiple joint pain, increased fatigue, FERRARA  She is concerned about Lyme  She is outside very frequently with cross [place]  Denies any recent tick bites  Denies rash  She also reports that she has severe restless legs syndrome that has never been treated  There are nights were she will not sleep and even sometimes sitting for a while causes it to start  She has tried Benadryl which made it worse  The following portions of the patient's history were reviewed and updated as appropriate: She  has a past medical history of Atypical lymphocytosis; Baker cyst; Chronic fatigue; Disease of thyroid gland; Elevated LFTs; Female infertility; Hypothyroidism; Night sweats; and Shortness of breath    She   Patient Active Problem List    Diagnosis Date Noted    Female infertility 10/02/2018    Hypothyroidism 02/20/2018    Menorrhagia 01/18/2018    Anxiety 12/06/2017    Mixed emotional features as adjustment reaction 11/28/2017    Thyroid nodule 10/11/2017    Uterine polyp 02/08/2017    MARIANO (generalized anxiety disorder) 02/06/2017    MDD (major depressive disorder), recurrent episode, mild ([facility] [address] ) 02/06/2017    Synovial cyst of left popliteal space 02/03/2017    Lymphadenopathy 01/20/2017    Raynaud's phenomenon 01/20/2017    Amenorrhea 02/03/2016    Arthralgia of multiple joints 12/24/2015    Vitamin D deficiency 11/18/2015     She  has a past surgical history that includes Colposcopy; Knee arthroscopy w/ ACL reconstruction; Ovarian cyst removal; [place] tooth extraction; Dilation and curettage of uterus; and Anterior cruciate ligament repair (Right)  Her family history includes Cancer in her maternal uncle; Diabetes in her mother; Diabetes type II in her maternal grandmother; Diabetes unspecified in her maternal grandfather and maternal uncle; Prostate cancer in her father; Thyroid disease unspecified in her mother and sister  She  reports that she has never smoked  She has never used smokeless tobacco  She reports that she does not drink alcohol or use drugs  Current Outpatient Prescriptions   Medication Sig Dispense Refill    aspirin 81 MG tablet Take by mouth      docusate sodium (COLACE) 100 mg capsule Take 1 capsule by mouth 2 (two) times a day 10 capsule 0    escitalopram (LEXAPRO) 10 mg tablet TAKE 1 TABLET DAILY 90 tablet 1    levothyroxine 25 mcg tablet take 1 tablet by mouth once daily 90 tablet 1    acetaminophen (TYLENOL) 325 mg tablet Headache (Patient not taking: Reported on 9/24/2018 ) 30 tablet 0    benzocaine-menthol-lanolin-aloe (DERMOPLAST) 20-0 5 % topical spray Apply 1 application topically 4 (four) times a day as needed for mild pain (Patient not taking: Reported on 9/24/2018 )  0    CHOLECALCIFEROL PO Take 1,000 Units by mouth      Omega-3 Fatty Acids (FISH OIL) 645 MG CAPS Take by mouth      pramipexole (MIRAPEX) 0 125 mg tablet Take 1 tablet (0 125 mg total) by mouth daily at bedtime 30 tablet 0     No current facility-administered medications for this visit        Current Outpatient Prescriptions on File Prior to Visit   Medication Sig    aspirin 81 MG tablet Take by mouth    docusate sodium (COLACE) 100 mg capsule Take 1 capsule by mouth 2 (two) times a day    escitalopram (LEXAPRO) 10 mg tablet TAKE 1 TABLET DAILY    levothyroxine 25 mcg tablet take 1 tablet by mouth once daily    acetaminophen (TYLENOL) 325 mg tablet Headache (Patient not taking: Reported on 9/24/2018 )  benzocaine-menthol-lanolin-aloe (DERMOPLAST) 20-0 5 % topical spray Apply 1 application topically 4 (four) times a day as needed for mild pain (Patient not taking: Reported on 9/24/2018 )    CHOLECALCIFEROL PO Take 1,000 Units by mouth    Omega-3 Fatty Acids (FISH OIL) 645 MG CAPS Take by mouth     No current facility-administered medications on file prior to visit  She is allergic to yeast     Review of Systems   Constitutional: Positive for fatigue  Respiratory: Negative  Cardiovascular: Negative  Gastrointestinal: Negative  Genitourinary: Negative  Musculoskeletal: Positive for arthralgias  Restless legs   Skin: Negative for rash  Neurological: Positive for headaches  Objective:      /70 (BP Location: Left arm, Patient Position: Sitting, Cuff Size: Adult)   Pulse 55   Temp 98 8 °F (37 1 °C) (Tympanic)   Resp 16   Ht 5' 2 99" (1 6 m)   Wt 60 3 kg (133 lb)   LMP 09/06/2018   SpO2 98%   BMI 23 57 kg/m²          Physical Exam   Constitutional: She is oriented to person, place, and time  She appears well-developed and well-nourished  No distress  HENT:   Mouth/Throat: Oropharynx is clear and moist    Neck: Neck supple  No thyromegaly present  Cardiovascular: Normal rate, regular rhythm and normal heart sounds  Pulmonary/Chest: Effort normal and breath sounds normal    Musculoskeletal: Normal range of motion  Lymphadenopathy:     She has no cervical adenopathy  Neurological: She is alert and oriented to person, place, and time  She has normal reflexes  Coordination and gait normal    Psychiatric: She has a normal mood and affect  Vitals reviewed

## 2018-09-25 LAB — RHEUMATOID FACT SER QL LA: NEGATIVE

## 2018-09-26 LAB
B BURGDOR IGG SER IA-ACNC: 0.35
B BURGDOR IGM SER IA-ACNC: 0.91
RYE IGE QN: NEGATIVE

## 2018-09-27 LAB
B BURGDOR IGG PATRN SER IB-IMP: NEGATIVE
B BURGDOR IGM PATRN SER IB-IMP: NEGATIVE
B BURGDOR18KD IGG SER QL IB: NORMAL
B BURGDOR23KD IGG SER QL IB: NORMAL
B BURGDOR23KD IGM SER QL IB: NORMAL
B BURGDOR28KD IGG SER QL IB: NORMAL
B BURGDOR30KD IGG SER QL IB: NORMAL
B BURGDOR39KD IGG SER QL IB: NORMAL
B BURGDOR39KD IGM SER QL IB: NORMAL
B BURGDOR41KD IGG SER QL IB: NORMAL
B BURGDOR41KD IGM SER QL IB: NORMAL
B BURGDOR45KD IGG SER QL IB: NORMAL
B BURGDOR58KD IGG SER QL IB: NORMAL
B BURGDOR66KD IGG SER QL IB: NORMAL
B BURGDOR93KD IGG SER QL IB: NORMAL

## 2018-10-02 PROBLEM — I73.00 RAYNAUD'S PHENOMENON: Status: ACTIVE | Noted: 2017-01-20

## 2018-10-02 PROBLEM — F43.29 MIXED EMOTIONAL FEATURES AS ADJUSTMENT REACTION: Status: ACTIVE | Noted: 2017-11-28

## 2018-10-02 PROBLEM — F41.1 GAD (GENERALIZED ANXIETY DISORDER): Status: ACTIVE | Noted: 2017-02-06

## 2018-10-02 PROBLEM — F33.0 MDD (MAJOR DEPRESSIVE DISORDER), RECURRENT EPISODE, MILD (HCC): Status: ACTIVE | Noted: 2017-02-06

## 2018-10-02 PROBLEM — N97.9 FEMALE INFERTILITY: Status: ACTIVE | Noted: 2018-10-02

## 2018-10-02 PROBLEM — N92.0 MENORRHAGIA: Status: ACTIVE | Noted: 2018-01-18

## 2018-10-02 PROBLEM — R59.1 LYMPHADENOPATHY: Status: ACTIVE | Noted: 2017-01-20

## 2018-10-02 PROBLEM — F41.9 ANXIETY: Status: ACTIVE | Noted: 2017-12-06

## 2018-10-02 PROBLEM — M71.22 SYNOVIAL CYST OF LEFT POPLITEAL SPACE: Status: ACTIVE | Noted: 2017-02-03

## 2018-10-02 PROBLEM — N84.0 UTERINE POLYP: Status: ACTIVE | Noted: 2017-02-08

## 2018-12-02 DIAGNOSIS — E03.9 HYPOTHYROIDISM, UNSPECIFIED TYPE: ICD-10-CM

## 2018-12-02 RX ORDER — LEVOTHYROXINE SODIUM 0.03 MG/1
TABLET ORAL
Qty: 90 TABLET | Refills: 1 | Status: SHIPPED | OUTPATIENT
Start: 2018-12-02 | End: 2019-11-06 | Stop reason: SDUPTHER

## 2019-01-01 DIAGNOSIS — F32.A DEPRESSION, UNSPECIFIED DEPRESSION TYPE: ICD-10-CM

## 2019-01-01 RX ORDER — ESCITALOPRAM OXALATE 10 MG/1
TABLET ORAL
Qty: 90 TABLET | Refills: 1 | Status: SHIPPED | OUTPATIENT
Start: 2019-01-01 | End: 2019-06-30 | Stop reason: SDUPTHER

## 2019-06-30 DIAGNOSIS — F32.A DEPRESSION, UNSPECIFIED DEPRESSION TYPE: ICD-10-CM

## 2019-06-30 RX ORDER — ESCITALOPRAM OXALATE 10 MG/1
TABLET ORAL
Qty: 90 TABLET | Refills: 1 | Status: SHIPPED | OUTPATIENT
Start: 2019-06-30 | End: 2019-12-27 | Stop reason: SDUPTHER

## 2019-10-09 ENCOUNTER — OFFICE VISIT (OUTPATIENT)
Dept: FAMILY MEDICINE CLINIC | Facility: CLINIC | Age: 39
End: 2019-10-09
Payer: COMMERCIAL

## 2019-10-09 VITALS
HEIGHT: 62 IN | WEIGHT: 130 LBS | SYSTOLIC BLOOD PRESSURE: 110 MMHG | HEART RATE: 65 BPM | OXYGEN SATURATION: 98 % | BODY MASS INDEX: 23.92 KG/M2 | TEMPERATURE: 98.5 F | RESPIRATION RATE: 16 BRPM | DIASTOLIC BLOOD PRESSURE: 80 MMHG

## 2019-10-09 DIAGNOSIS — E04.1 THYROID NODULE: ICD-10-CM

## 2019-10-09 DIAGNOSIS — Z13.1 SCREENING FOR DIABETES MELLITUS: ICD-10-CM

## 2019-10-09 DIAGNOSIS — Z13.220 SCREENING FOR CHOLESTEROL LEVEL: ICD-10-CM

## 2019-10-09 DIAGNOSIS — E03.9 HYPOTHYROIDISM, UNSPECIFIED TYPE: ICD-10-CM

## 2019-10-09 DIAGNOSIS — F41.9 ANXIETY: ICD-10-CM

## 2019-10-09 DIAGNOSIS — Z23 NEED FOR VACCINATION: Primary | ICD-10-CM

## 2019-10-09 PROCEDURE — 90471 IMMUNIZATION ADMIN: CPT | Performed by: FAMILY MEDICINE

## 2019-10-09 PROCEDURE — 99214 OFFICE O/P EST MOD 30 MIN: CPT | Performed by: FAMILY MEDICINE

## 2019-10-09 PROCEDURE — 3008F BODY MASS INDEX DOCD: CPT | Performed by: FAMILY MEDICINE

## 2019-10-09 PROCEDURE — 90686 IIV4 VACC NO PRSV 0.5 ML IM: CPT | Performed by: FAMILY MEDICINE

## 2019-10-09 NOTE — ASSESSMENT & PLAN NOTE
Doing well on levothyroxine 25 mcg daily  Patient has been complaining of increasing fatigue lately    Will recheck level

## 2019-10-09 NOTE — PROGRESS NOTES
50 [facility]      NAME: Domenic Garcia  AGE: 42 y o  SEX: female  : 1980   MRN: [id number]    DATE: 10/9/2019  TIME: 12:08 PM    Assessment and Plan     Problem List Items Addressed This Visit     Thyroid nodule      History of thyroid nodule  Last ultrasound 2017  Will repeat  Will call with results         Relevant Orders    TSH, 3rd generation with Free T4 reflex    US thyroid    Hypothyroidism      Doing well on levothyroxine 25 mcg daily  Patient has been complaining of increasing fatigue lately  Will recheck level         Relevant Orders    TSH, 3rd generation with Free T4 reflex    Anxiety      Doing well on Lexapro 10 mg daily         Relevant Orders    CBC and differential      Other Visit Diagnoses     Need for vaccination    -  Primary    Relevant Orders    influenza vaccine, 4071-6887, quadrivalent, 0 5 mL, preservative-free, for adult and pediatric patients 6 mos+ (AFLURIA, FLUARIX, FLULAVAL, FLUZONE) (Completed)    Screening for cholesterol level        Relevant Orders    Lipid Panel with Direct LDL reflex    Screening for diabetes mellitus        Relevant Orders    Comprehensive metabolic panel              Return to office in:  Rx given for fasting blood work at [facility] and thyroid ultrasound    Chief Complaint     Chief Complaint   Patient presents with    Follow-up     Pt would like BW script       History of Present Illness      Patient presents for recheck appointment today  She has also been tired lately  She is also not had her thyroid checked in a while  She has been taking levothyroxine 25 mcg daily  Also on Lexapro 10 mg daily for anxiety    She is doing well      The following portions of the patient's history were reviewed and updated as appropriate: allergies, current medications, past family history, past medical history, past social history, past surgical history and problem list     Review of Systems   Review of Systems Constitutional: Positive for fatigue  Respiratory: Negative  Cardiovascular: Negative  Gastrointestinal: Negative  Genitourinary: Negative  Active Problem List     Patient Active Problem List   Diagnosis    Thyroid nodule    Vitamin D deficiency    Hypothyroidism    Mixed emotional features as adjustment reaction    Amenorrhea    Anxiety    Arthralgia of multiple joints    Female infertility    MARIANO (generalized anxiety disorder)    Lymphadenopathy    MDD (major depressive disorder), recurrent episode, mild (HCC)    Menorrhagia    Raynaud's phenomenon    Synovial cyst of left popliteal space    Uterine polyp       Objective   /80 (BP Location: Left arm, Patient Position: Sitting, Cuff Size: Adult)   Pulse 65   Temp 98 5 °F (36 9 °C) (Tympanic)   Resp 16   Ht 5' 2 4" (1 585 m)   Wt 59 kg (130 lb)   LMP 09/18/2019   SpO2 98%   BMI 23 47 kg/m²     Physical Exam   Cardiovascular: Normal rate, regular rhythm, normal heart sounds and intact distal pulses  Carotids: no bruits  Ext: no edema   Pulmonary/Chest: Effort normal  No respiratory distress  She has no wheezes  She has no rales  Psychiatric: She has a normal mood and affect   Her behavior is normal  Thought content normal        Pertinent Laboratory/Diagnostic Studies:    Thyroid ultrasound 2017    Current Medications     Current Outpatient Medications:     acetaminophen (TYLENOL) 325 mg tablet, Headache, Disp: 30 tablet, Rfl: 0    escitalopram (LEXAPRO) 10 mg tablet, TAKE 1 TABLET DAILY, Disp: 90 tablet, Rfl: 1    levothyroxine 25 mcg tablet, take 1 tablet by mouth once daily, Disp: 90 tablet, Rfl: 1    docusate sodium (COLACE) 100 mg capsule, Take 1 capsule by mouth 2 (two) times a day (Patient not taking: Reported on 10/9/2019), Disp: 10 capsule, Rfl: 0    Health Maintenance     Health Maintenance   Topic Date Due    BMI: Adult  08/13/2000    Cervical Cancer Screening  08/13/2003    INFLUENZA VACCINE 07/01/2019    DTaP,Tdap,and Td Vaccines (4 - Td) 08/28/2027    Pneumococcal Vaccine: 65+ Years (1 of 2 - PCV13) 08/13/2047    Pneumococcal Vaccine: Pediatrics (0 to 5 Years) and At-Risk Patients (6 to 59 Years)  Aged Out    HEPATITIS B VACCINES  Aged [employer] History   Administered Date(s) Administered    Hep B, adult 03/29/2013    INFLUENZA 09/01/2012, 09/25/2014, 12/24/2015, 09/01/2016, 09/12/2017    Influenza Quadrivalent Preservative Free 3 years and older IM 09/25/2014    Influenza Quadrivalent, 6-35 Months IM 12/24/2015, 09/01/2016, 09/01/2017    Influenza, injectable, quadrivalent, preservative free 0 5 mL 10/09/2019    Influenza, recombinant, quadrivalent,injectable, preservative free 09/24/2018    Tdap 01/01/2011, 06/14/2013, 08/28/2017    Tuberculin Skin Test-PPD Intradermal 03/22/2013       Sherie Youssef DO  [facility]

## 2019-10-17 ENCOUNTER — HOSPITAL ENCOUNTER (OUTPATIENT)
Dept: ULTRASOUND IMAGING | Facility: MEDICAL CENTER | Age: 39
Discharge: HOME | End: 2019-10-17
Payer: COMMERCIAL

## 2019-10-17 DIAGNOSIS — E04.1 THYROID NODULE: ICD-10-CM

## 2019-10-17 PROCEDURE — 76536 US EXAM OF HEAD AND NECK: CPT

## 2019-10-22 ENCOUNTER — TELEPHONE (OUTPATIENT)
Dept: FAMILY MEDICINE CLINIC | Facility: CLINIC | Age: 39
End: 2019-10-22

## 2019-10-22 NOTE — TELEPHONE ENCOUNTER
----- Message from Odalys Fernandez DO sent at 10/21/2019  8:46 PM EDT -----  Call patient, her thyroid ultrasound was stable compared to prior ultrasound 2 years ago  Recommend repeat ultrasound again in 2 years    If this is still unchanged, then she no longer needs to have this followed

## 2019-11-06 DIAGNOSIS — E03.9 HYPOTHYROIDISM, UNSPECIFIED TYPE: ICD-10-CM

## 2019-11-06 RX ORDER — LEVOTHYROXINE SODIUM 0.03 MG/1
TABLET ORAL
Qty: 90 TABLET | Refills: 1 | Status: SHIPPED | OUTPATIENT
Start: 2019-11-06 | End: 2020-06-08

## 2019-12-27 DIAGNOSIS — F32.A DEPRESSION, UNSPECIFIED DEPRESSION TYPE: ICD-10-CM

## 2019-12-28 RX ORDER — ESCITALOPRAM OXALATE 10 MG/1
TABLET ORAL
Qty: 90 TABLET | Refills: 4 | Status: SHIPPED | OUTPATIENT
Start: 2019-12-28 | End: 2020-09-25

## 2020-02-09 NOTE — DISCHARGE INSTRUCTIONS
Vaginal Delivery   WHAT YOU SHOULD KNOW:   A vaginal delivery is the birth of your baby through your vagina (birth canal)  AFTER YOU LEAVE:   Medicines:  · NSAIDs  help decrease swelling and pain or fever  This medicine is available with or without a doctor's order  NSAIDs can cause stomach bleeding or kidney problems in certain people  If you take blood thinner medicine, always ask your healthcare provider if NSAIDs are safe for you  Always read the medicine label and follow directions  · Take your medicine as directed  Call your healthcare provider if you think your medicine is not helping or if you have side effects  Tell him if you are allergic to any medicine  Keep a list of the medicines, vitamins, and herbs you take  Include the amounts, and when and why you take them  Bring the list or the pill bottles to follow-up visits  Carry your medicine list with you in case of an emergency  Follow up with your primary healthcare provider:  Most women need to return 6 weeks after a vaginal delivery  Ask about how to care for your wounds or stitches  Write down your questions so you remember to ask them during your visits  Activity:  Rest as much as possible  Try to keep all activities short  You may be able to do some exercise soon after you have your baby  Talk with your primary healthcare provider before you start exercising  If you work outside the home, ask when you can return to your job  Kegel exercises:  Kegel exercises may help your vaginal and rectal muscles heal faster  You can do Kegel exercises by tightening and relaxing the muscles around your vagina  Kegel exercises help make the muscles stronger  Breast care:  When your milk comes in, your breasts may feel full and hard  Ask how to care for your breasts, even if you are not breastfeeding  Constipation:  Do not try to push the bowel movement out if it is too hard   High-fiber foods, extra liquids, and regular exercise can help you [>50% of Time Spent on Counseling and Coordination of Care for  ___] : Greater than 50% of the encounter time was spent on counseling and coordination of care for [unfilled] prevent constipation  Examples of high-fiber foods are fruit and bran  Prune juice and water are good liquids to drink  Regular exercise helps your digestive system work  You may also be told to take over-the-counter fiber and stool softener medicines  Take these items as directed  Hemorrhoids:  Pregnancy can cause severe hemorrhoids  You may have rectal pain because of the hemorrhoids  Ask how to prevent or treat hemorrhoids  Perineum care: Your perineum is the area between your vagina and anus  Keep the area clean and dry to help it heal and to prevent infection  Wash the area gently with soap and water when you bathe or shower  Rinse your perineum with warm water when you use the toilet  Your primary healthcare provider may suggest you use a warm sitz bath to help decrease pain  A sitz bath is a bathtub or basin filled to hip level  Stay in the sitz bath for 20 to 30 minutes, or as directed  Vaginal discharge: You will have vaginal discharge, called lochia, after your delivery  The lochia is bright red the first day or two after the birth  By the fourth day, the amount decreases, and it turns red-brown  Use a sanitary pad rather than a tampon to prevent a vaginal infection  It is normal to have lochia up to 8 weeks after your baby is born  Monthly periods: Your period may start again within 7 to 12 weeks after your baby is born  If you are breastfeeding, it may take longer for your period to start again  You can still get pregnant again even though you do not have your monthly period  Talk with your primary healthcare provider about a birth control method that will be good for you if you do not want to get pregnant  Mood changes: Many new mothers have some kind of mood changes after delivery  Some of these changes occur because of lack of sleep, hormone changes, and caring for a new baby  Some mood changes can be more serious, such as postpartum depression   Talk with your primary healthcare provider if you feel unable to care for yourself or your baby  Sexual activity:  You may need to avoid sex for 6 to 7 weeks after you have your baby  You may notice you have a decreased desire for sex, or sex may be painful  You may need to use a vaginal lubricant (gel) to help make sex more comfortable  Contact your primary healthcare provider if:   · You have heavy vaginal bleeding that fills 1 or more sanitary pads in 1 hour  · You have a fever  · Your pain does not go away, or gets worse  · The skin between your vagina and rectum is swollen, warm, or red  · You have swollen, hard, or painful breasts  · You feel very sad or depressed  · You feel more tired than usual      · You have questions or concerns about your condition or care  Seek care immediately or call 911 if:   · You have pus or yellow drainage coming from your vagina or wound  · You are urinating very little, or not at all  · Your arm or leg feels warm, tender, and painful  It may look swollen and red  · You feel lightheaded, have sudden and worsening chest pain, or trouble breathing  You may have more pain when you take deep breaths or cough, or you may cough up blood  © 2014 [address] is for End User's use only and may not be sold, redistributed or otherwise used for commercial purposes  All illustrations and images included in CareNotes® are the copyrighted property of [employer]  or [address]  The above information is an  only  It is not intended as medical advice for individual conditions or treatments  Talk to your doctor, nurse or pharmacist before following any medical regimen to see if it is safe and effective for you

## 2020-06-06 DIAGNOSIS — E03.9 HYPOTHYROIDISM, UNSPECIFIED TYPE: ICD-10-CM

## 2020-06-08 RX ORDER — LEVOTHYROXINE SODIUM 0.03 MG/1
TABLET ORAL
Qty: 90 TABLET | Refills: 1 | Status: SHIPPED | OUTPATIENT
Start: 2020-06-08 | End: 2021-03-29 | Stop reason: SDUPTHER

## 2020-09-25 ENCOUNTER — OFFICE VISIT (OUTPATIENT)
Dept: FAMILY MEDICINE CLINIC | Facility: CLINIC | Age: 40
End: 2020-09-25
Payer: COMMERCIAL

## 2020-09-25 VITALS
SYSTOLIC BLOOD PRESSURE: 120 MMHG | BODY MASS INDEX: 24.48 KG/M2 | WEIGHT: 133 LBS | TEMPERATURE: 97.6 F | OXYGEN SATURATION: 99 % | HEIGHT: 62 IN | HEART RATE: 77 BPM | RESPIRATION RATE: 16 BRPM | DIASTOLIC BLOOD PRESSURE: 80 MMHG

## 2020-09-25 DIAGNOSIS — E03.9 HYPOTHYROIDISM, UNSPECIFIED TYPE: ICD-10-CM

## 2020-09-25 DIAGNOSIS — Z13.220 SCREENING FOR CHOLESTEROL LEVEL: ICD-10-CM

## 2020-09-25 DIAGNOSIS — E04.1 THYROID NODULE: ICD-10-CM

## 2020-09-25 DIAGNOSIS — G47.00 INSOMNIA, UNSPECIFIED TYPE: ICD-10-CM

## 2020-09-25 DIAGNOSIS — Z13.0 SCREENING FOR DEFICIENCY ANEMIA: ICD-10-CM

## 2020-09-25 DIAGNOSIS — Z13.1 SCREENING FOR DIABETES MELLITUS: ICD-10-CM

## 2020-09-25 DIAGNOSIS — F41.9 ANXIETY: Primary | ICD-10-CM

## 2020-09-25 PROCEDURE — 1036F TOBACCO NON-USER: CPT | Performed by: FAMILY MEDICINE

## 2020-09-25 PROCEDURE — 99214 OFFICE O/P EST MOD 30 MIN: CPT | Performed by: FAMILY MEDICINE

## 2020-09-25 NOTE — PROGRESS NOTES
50 [facility]      NAME: Olman Joseph  AGE: 47 y o  SEX: female  : 1980   MRN: [id number]    DATE: 2020  TIME: 5:09 PM    Assessment and Plan     Problem List Items Addressed This Visit     Thyroid nodule      History of thyroid nodule  Previous biopsy negative  Last ultrasound from 10/17/2019 was stable  Recommendation from radiologist was to repeat again 2021  If stable at that time, no further follow-up  Will be needed         Hypothyroidism      Compliant with Lexapro 25 mg daily  She has not had labs done in quite some time  Rx given for fasting blood work  Relevant Orders    TSH, 3rd generation with Free T4 reflex    Anxiety - Primary     Pt c/o of worsening anxiety lately, especially since COVID pandemic began  Also problems falling and staying asleep  Will discontinue Lexapro  Will start sertraline 50 mg daily  I would like to see her back in 4-6 weeks  Relevant Medications    sertraline (ZOLOFT) 50 mg tablet    Insomnia       Patient complains of problems falling and staying asleep  She has tried OTC melatonin and diphenhydramine without benefit  I am hoping that initiation of sertraline will be beneficial for this  Consider trial of trazodone or doxepin if needed           Other Visit Diagnoses     Screening for deficiency anemia        Relevant Orders    CBC and differential    Screening for diabetes mellitus        Relevant Orders    Comprehensive metabolic panel    Screening for cholesterol level        Relevant Orders    Lipid Panel with Direct LDL reflex              Return to office in:  4-6 weeks    Chief Complaint     Chief Complaint   Patient presents with    Follow-up     meds check up       History of Present Illness     Pt c/o of worsening anxiety lately, especially since COVID pandemic began  Also problems falling and staying asleep  She is compliant with Lexapro 10 mg daily    He is also compliant with her levothyroxine 25 mg daily  She has not had labs done in quite some time      The following portions of the patient's history were reviewed and updated as appropriate: allergies, current medications, past family history, past medical history, past social history, past surgical history and problem list     Review of Systems   Review of Systems   Respiratory: Negative  Cardiovascular: Negative  Gastrointestinal: Negative  Genitourinary: Negative  Psychiatric/Behavioral: Positive for sleep disturbance  Negative for suicidal ideas  The patient is nervous/anxious          Active Problem List     Patient Active Problem List   Diagnosis    Thyroid nodule    Vitamin D deficiency    Hypothyroidism    Mixed emotional features as adjustment reaction    Amenorrhea    Anxiety    Arthralgia of multiple joints    Female infertility    MARIANO (generalized anxiety disorder)    Lymphadenopathy    MDD (major depressive disorder), recurrent episode, mild (HCC)    Menorrhagia    Raynaud's phenomenon    Synovial cyst of left popliteal space    Uterine polyp    Insomnia       Objective   /80 (BP Location: Left arm, Patient Position: Sitting, Cuff Size: Adult)   Pulse 77   Temp 97 6 °F (36 4 °C) (Tympanic)   Resp 16   Ht 5' 2 21" (1 58 m)   Wt 60 3 kg (133 lb)   LMP 08/25/2020   SpO2 99%   BMI 24 17 kg/m²     Physical Exam    Pertinent Laboratory/Diagnostic Studies:  previous med list reviewed    Current Medications     Current Outpatient Medications:     acetaminophen (TYLENOL) 325 mg tablet, Headache, Disp: 30 tablet, Rfl: 0    levothyroxine 25 mcg tablet, take 1 tablet by mouth once daily, Disp: 90 tablet, Rfl: 1    sertraline (ZOLOFT) 50 mg tablet, Take 1 tablet (50 mg total) by mouth daily, Disp: 30 tablet, Rfl: 2    Health Maintenance     Health Maintenance   Topic Date Due    Annual Physical  08/13/2000    Cervical Cancer Screening  08/13/2003    Influenza Vaccine  07/01/2020    BMI: Adult  09/25/2021    DTaP,Tdap,and Td Vaccines (4 - Td) 08/28/2027    HIV Screening  Completed    Pneumococcal Vaccine: Pediatrics (0 to 5 Years) and At-Risk Patients (6 to 59 Years)  Aged Out    HIB Vaccine  Aged Out    Hepatitis B Vaccine  Aged Out    IPV Vaccine  Aged Out    Hepatitis A Vaccine  Aged Out    Meningococcal ACWY Vaccine  Aged Out    HPV Vaccine  Aged [employer] History   Administered Date(s) Administered    Hep B, adult 03/29/2013    INFLUENZA 09/01/2012, 09/25/2014, 12/24/2015, 09/01/2016, 09/12/2017    Influenza Quadrivalent Preservative Free 3 years and older IM 09/25/2014    Influenza Quadrivalent, 6-35 Months IM 12/24/2015, 09/01/2016, 09/01/2017    Influenza, injectable, quadrivalent, preservative free 0 5 mL 10/09/2019    Influenza, recombinant, quadrivalent,injectable, preservative free 09/24/2018    Tdap 01/01/2011, 06/14/2013, 08/28/2017    Tuberculin Skin Test-PPD Intradermal 03/22/2013       Alexa Freeman DO  [facility]

## 2020-09-25 NOTE — ASSESSMENT & PLAN NOTE
Patient complains of problems falling and staying asleep  She has tried OTC melatonin and diphenhydramine without benefit  I am hoping that initiation of sertraline will be beneficial for this    Consider trial of trazodone or doxepin if needed

## 2020-09-25 NOTE — ASSESSMENT & PLAN NOTE
Pt c/o of worsening anxiety lately, especially since COVID pandemic began  Also problems falling and staying asleep  Will discontinue Lexapro  Will start sertraline 50 mg daily  I would like to see her back in 4-6 weeks

## 2020-09-25 NOTE — ASSESSMENT & PLAN NOTE
Compliant with Lexapro 25 mg daily  She has not had labs done in quite some time  Rx given for fasting blood work

## 2020-09-25 NOTE — ASSESSMENT & PLAN NOTE
History of thyroid nodule  Previous biopsy negative  Last ultrasound from 10/17/2019 was stable  Recommendation from radiologist was to repeat again October 2021    If stable at that time, no further follow-up  Will be needed

## 2020-10-29 ENCOUNTER — OFFICE VISIT (OUTPATIENT)
Dept: FAMILY MEDICINE CLINIC | Facility: CLINIC | Age: 40
End: 2020-10-29
Payer: COMMERCIAL

## 2020-10-29 VITALS
HEIGHT: 62 IN | DIASTOLIC BLOOD PRESSURE: 80 MMHG | WEIGHT: 137 LBS | TEMPERATURE: 97.8 F | SYSTOLIC BLOOD PRESSURE: 120 MMHG | BODY MASS INDEX: 25.21 KG/M2 | RESPIRATION RATE: 16 BRPM | OXYGEN SATURATION: 97 % | HEART RATE: 67 BPM

## 2020-10-29 DIAGNOSIS — F41.9 ANXIETY: Primary | ICD-10-CM

## 2020-10-29 DIAGNOSIS — G47.00 INSOMNIA, UNSPECIFIED TYPE: ICD-10-CM

## 2020-10-29 DIAGNOSIS — Z23 NEED FOR VACCINATION: ICD-10-CM

## 2020-10-29 DIAGNOSIS — G25.81 RESTLESS LEG SYNDROME: ICD-10-CM

## 2020-10-29 PROCEDURE — 3008F BODY MASS INDEX DOCD: CPT | Performed by: FAMILY MEDICINE

## 2020-10-29 PROCEDURE — 90471 IMMUNIZATION ADMIN: CPT | Performed by: FAMILY MEDICINE

## 2020-10-29 PROCEDURE — 99214 OFFICE O/P EST MOD 30 MIN: CPT | Performed by: FAMILY MEDICINE

## 2020-10-29 PROCEDURE — 3725F SCREEN DEPRESSION PERFORMED: CPT | Performed by: FAMILY MEDICINE

## 2020-10-29 PROCEDURE — 90686 IIV4 VACC NO PRSV 0.5 ML IM: CPT | Performed by: FAMILY MEDICINE

## 2020-10-29 RX ORDER — TRAZODONE HYDROCHLORIDE 50 MG/1
50 TABLET ORAL
Qty: 30 TABLET | Refills: 1 | Status: SHIPPED | OUTPATIENT
Start: 2020-10-29 | End: 2021-01-25 | Stop reason: SDUPTHER

## 2020-11-30 ENCOUNTER — TELEPHONE (OUTPATIENT)
Dept: FAMILY MEDICINE CLINIC | Facility: CLINIC | Age: 40
End: 2020-11-30

## 2021-01-14 DIAGNOSIS — Z23 ENCOUNTER FOR IMMUNIZATION: ICD-10-CM

## 2021-01-20 ENCOUNTER — IMMUNIZATIONS (OUTPATIENT)
Dept: FAMILY MEDICINE CLINIC | Facility: HOSPITAL | Age: 41
End: 2021-01-20

## 2021-01-20 DIAGNOSIS — Z23 ENCOUNTER FOR IMMUNIZATION: Primary | ICD-10-CM

## 2021-01-20 PROCEDURE — 0011A SARS-COV-2 / COVID-19 MRNA VACCINE (MODERNA) 100 MCG: CPT

## 2021-01-20 PROCEDURE — 91301 SARS-COV-2 / COVID-19 MRNA VACCINE (MODERNA) 100 MCG: CPT

## 2021-01-25 DIAGNOSIS — G47.00 INSOMNIA, UNSPECIFIED TYPE: ICD-10-CM

## 2021-01-25 RX ORDER — TRAZODONE HYDROCHLORIDE 50 MG/1
50 TABLET ORAL
Qty: 30 TABLET | Refills: 1 | Status: SHIPPED | OUTPATIENT
Start: 2021-01-25 | End: 2021-02-22 | Stop reason: SDUPTHER

## 2021-02-18 ENCOUNTER — IMMUNIZATIONS (OUTPATIENT)
Dept: FAMILY MEDICINE CLINIC | Facility: HOSPITAL | Age: 41
End: 2021-02-18

## 2021-02-18 DIAGNOSIS — Z23 ENCOUNTER FOR IMMUNIZATION: Primary | ICD-10-CM

## 2021-02-18 PROCEDURE — 91301 SARS-COV-2 / COVID-19 MRNA VACCINE (MODERNA) 100 MCG: CPT

## 2021-02-18 PROCEDURE — 0012A SARS-COV-2 / COVID-19 MRNA VACCINE (MODERNA) 100 MCG: CPT

## 2021-02-22 DIAGNOSIS — G47.00 INSOMNIA, UNSPECIFIED TYPE: ICD-10-CM

## 2021-02-22 RX ORDER — TRAZODONE HYDROCHLORIDE 50 MG/1
50 TABLET ORAL
Qty: 30 TABLET | Refills: 1 | Status: SHIPPED | OUTPATIENT
Start: 2021-02-22 | End: 2021-05-05 | Stop reason: SDUPTHER

## 2021-03-29 DIAGNOSIS — F41.9 ANXIETY: ICD-10-CM

## 2021-03-29 DIAGNOSIS — E03.9 HYPOTHYROIDISM, UNSPECIFIED TYPE: ICD-10-CM

## 2021-03-29 RX ORDER — LEVOTHYROXINE SODIUM 0.03 MG/1
25 TABLET ORAL DAILY
Qty: 90 TABLET | Refills: 1 | Status: SHIPPED | OUTPATIENT
Start: 2021-03-29 | End: 2021-09-20 | Stop reason: SDUPTHER

## 2021-04-24 DIAGNOSIS — F41.9 ANXIETY: ICD-10-CM

## 2021-04-24 RX ORDER — SERTRALINE HYDROCHLORIDE 100 MG/1
TABLET, FILM COATED ORAL
Qty: 90 TABLET | Refills: 0
Start: 2021-04-24 | End: 2021-04-26 | Stop reason: SDUPTHER

## 2021-04-26 DIAGNOSIS — F41.9 ANXIETY: ICD-10-CM

## 2021-04-26 RX ORDER — SERTRALINE HYDROCHLORIDE 100 MG/1
TABLET, FILM COATED ORAL
Qty: 90 TABLET | Refills: 0 | Status: SHIPPED | OUTPATIENT
Start: 2021-04-26 | End: 2021-06-24 | Stop reason: SDUPTHER

## 2021-05-05 DIAGNOSIS — G47.00 INSOMNIA, UNSPECIFIED TYPE: ICD-10-CM

## 2021-05-05 RX ORDER — TRAZODONE HYDROCHLORIDE 50 MG/1
50 TABLET ORAL
Qty: 30 TABLET | Refills: 1 | Status: SHIPPED | OUTPATIENT
Start: 2021-05-05 | End: 2021-07-08 | Stop reason: SDUPTHER

## 2021-05-06 PROBLEM — F10.11 HISTORY OF ALCOHOL ABUSE: Status: ACTIVE | Noted: 2021-05-06

## 2021-05-10 ENCOUNTER — OFFICE VISIT (OUTPATIENT)
Dept: FAMILY MEDICINE CLINIC | Facility: CLINIC | Age: 41
End: 2021-05-10
Payer: COMMERCIAL

## 2021-05-10 VITALS
OXYGEN SATURATION: 98 % | TEMPERATURE: 99.2 F | DIASTOLIC BLOOD PRESSURE: 80 MMHG | HEART RATE: 78 BPM | SYSTOLIC BLOOD PRESSURE: 120 MMHG | RESPIRATION RATE: 14 BRPM | BODY MASS INDEX: 23.74 KG/M2 | HEIGHT: 62 IN | WEIGHT: 129 LBS

## 2021-05-10 DIAGNOSIS — G47.00 INSOMNIA, UNSPECIFIED TYPE: ICD-10-CM

## 2021-05-10 DIAGNOSIS — F41.9 ANXIETY: ICD-10-CM

## 2021-05-10 DIAGNOSIS — E03.9 HYPOTHYROIDISM, UNSPECIFIED TYPE: ICD-10-CM

## 2021-05-10 DIAGNOSIS — F10.11 HISTORY OF ALCOHOL ABUSE: Primary | ICD-10-CM

## 2021-05-10 PROCEDURE — 3008F BODY MASS INDEX DOCD: CPT | Performed by: FAMILY MEDICINE

## 2021-05-10 PROCEDURE — 99214 OFFICE O/P EST MOD 30 MIN: CPT | Performed by: FAMILY MEDICINE

## 2021-05-10 PROCEDURE — 1036F TOBACCO NON-USER: CPT | Performed by: FAMILY MEDICINE

## 2021-05-10 RX ORDER — NALTREXONE HYDROCHLORIDE 50 MG/1
50 TABLET, FILM COATED ORAL DAILY
Qty: 30 TABLET | Refills: 2
Start: 2021-05-10 | End: 2021-07-01 | Stop reason: SDUPTHER

## 2021-05-11 NOTE — ASSESSMENT & PLAN NOTE
Patient went to detox and rehab for alcohol addiction November 2020 in [place] ([facility])  Since that time, she has done very well  She has not had any alcohol in over 5 months  She was started on naltrexone 50 mg nightly  Patient is currently doing IOP  3 times a week and going to AA meetings twice weekly    Recommend recheck in 3 months

## 2021-05-11 NOTE — PROGRESS NOTES
50 [facility]      NAME: Yoan Meyers  AGE: 47 y o  SEX: female  : 1980   MRN: [id number]    DATE: 5/10/2021  TIME: 8:16 PM    Assessment and Plan     Problem List Items Addressed This Visit     Hypothyroidism      Doing well levothyroxine 25 mcg daily  Patient will be due for labs at next visit         Anxiety      Doing well on sertraline 100 mg daily         Insomnia      Doing well with rare/occasional use of trazodone 50 HS         History of alcohol abuse - Primary     Patient went to detox and rehab for alcohol addiction 2020 in [place] ([facility])  Since that time, she has done very well  She has not had any alcohol in over 5 months  She was started on naltrexone 50 mg nightly  Patient is currently doing IOP  3 times a week and going to AA meetings twice weekly  Recommend recheck in 3 months         Relevant Medications    naltrexone (REVIA) 50 mg tablet              Return to office in: Three months  Will be due for labs at that time    Chief Complaint     Chief Complaint   Patient presents with    Follow-up     meds check up       History of Present Illness      Patient presents for med recheck today  Patient went to detox and rehab for alcohol addiction 2020 in [place] ([facility])  Since that time, she has done very well  She has not had any alcohol in over 5 months  She was started on naltrexone 50 mg nightly  She is also still taking sertraline 100 for anxiety  Levothyroxine 25 for hypothyroid  And occasional use of trazodone 50 HS for insomnia  The following portions of the patient's history were reviewed and updated as appropriate: allergies, current medications, past family history, past medical history, past social history, past surgical history and problem list     Review of Systems   Review of Systems   Respiratory: Negative  Cardiovascular: Negative  Gastrointestinal: Negative  Genitourinary: Negative          Active Problem List Patient Active Problem List   Diagnosis    Thyroid nodule    Vitamin D deficiency    Hypothyroidism    Mixed emotional features as adjustment reaction    Amenorrhea    Anxiety    Arthralgia of multiple joints    Female infertility    MARIANO (generalized anxiety disorder)    Lymphadenopathy    MDD (major depressive disorder), recurrent episode, mild (HCC)    Menorrhagia    Raynaud's phenomenon    Synovial cyst of left popliteal space    Uterine polyp    Insomnia    Restless leg syndrome    History of alcohol abuse       Objective   /80 (BP Location: Left arm, Patient Position: Sitting, Cuff Size: Adult)   Pulse 78   Temp 99 2 °F (37 3 °C) (Tympanic)   Resp 14   Ht 5' 2 21" (1 58 m)   Wt 58 5 kg (129 lb)   LMP 04/10/2021   SpO2 98%   BMI 23 44 kg/m²     Physical Exam  Cardiovascular:      Rate and Rhythm: Normal rate and regular rhythm  Heart sounds: Normal heart sounds  Comments: Carotids: no bruits  Ext: no edema  Pulmonary:      Effort: Pulmonary effort is normal  No respiratory distress  Breath sounds: No wheezing or rales  Psychiatric:         Behavior: Behavior normal          Thought Content:  Thought content normal          Pertinent Laboratory/Diagnostic Studies:  Last labs reviewed    Current Medications     Current Outpatient Medications:     levothyroxine 25 mcg tablet, Take 1 tablet (25 mcg total) by mouth daily, Disp: 90 tablet, Rfl: 1    sertraline (ZOLOFT) 100 mg tablet, 1 qd, Disp: 90 tablet, Rfl: 0    traZODone (DESYREL) 50 mg tablet, Take 1 tablet (50 mg total) by mouth daily at bedtime, Disp: 30 tablet, Rfl: 1    naltrexone (REVIA) 50 mg tablet, Take 1 tablet (50 mg total) by mouth daily, Disp: 30 tablet, Rfl: 2    Health Maintenance     Health Maintenance   Topic Date Due    Pneumococcal Vaccine: Pediatrics (0 to 5 Years) and At-Risk Patients (6 to 59 Years) (1 of 1 - PPSV23) Never done    Annual Physical  Never done    Cervical Cancer Screening  Never done    Depression Remission PHQ  10/29/2021    BMI: Adult  05/10/2022    DTaP,Tdap,and Td Vaccines (4 - Td) 08/28/2027    HIV Screening  Completed    Influenza Vaccine  Completed    COVID-19 Vaccine  Completed    HIB Vaccine  Aged Out    Hepatitis B Vaccine  Aged Out    IPV Vaccine  Aged Out    Hepatitis A Vaccine  Aged Out    Meningococcal ACWY Vaccine  Aged Out    HPV Vaccine  Aged Out     Immunization History   Administered Date(s) Administered    Hep B, adult 03/29/2013    INFLUENZA 09/01/2012, 09/25/2014, 12/24/2015, 09/01/2016, 09/12/2017    Influenza Quadrivalent Preservative Free 3 years and older IM 09/25/2014    Influenza Quadrivalent, 6-35 Months IM 12/24/2015, 09/01/2016, 09/01/2017    Influenza, injectable, quadrivalent, preservative free 0 5 mL 10/09/2019, 10/29/2020    Influenza, recombinant, quadrivalent,injectable, preservative free 09/24/2018    SARS-CoV-2 / COVID-19 mRNA IM (Moderna) 01/20/2021, 02/18/2021    Tdap 01/01/2011, 06/14/2013, 08/28/2017    Tuberculin Skin Test-PPD Intradermal 03/22/2013       Tomasa Sharpe DO  [facility]

## 2021-06-24 DIAGNOSIS — F41.9 ANXIETY: ICD-10-CM

## 2021-06-24 RX ORDER — SERTRALINE HYDROCHLORIDE 100 MG/1
TABLET, FILM COATED ORAL
Qty: 90 TABLET | Refills: 0 | Status: SHIPPED | OUTPATIENT
Start: 2021-06-24 | End: 2021-10-01 | Stop reason: SDUPTHER

## 2021-06-24 NOTE — TELEPHONE ENCOUNTER
----- Message from Olga Fagan sent at 6/24/2021  8:09 AM EDT -----  Regarding: Prescription Question  Contact: [phone]  Hi Dr Janice Minaya,     I have no more refills left on my Zoloft, 100 mg  Can you please refill my extra at [facility] today? Thank you       Elena Hurd

## 2021-07-01 DIAGNOSIS — F10.11 HISTORY OF ALCOHOL ABUSE: ICD-10-CM

## 2021-07-01 RX ORDER — NALTREXONE HYDROCHLORIDE 50 MG/1
50 TABLET, FILM COATED ORAL DAILY
Qty: 30 TABLET | Refills: 2
Start: 2021-07-01 | End: 2021-07-06 | Stop reason: SDUPTHER

## 2021-07-06 DIAGNOSIS — F10.11 HISTORY OF ALCOHOL ABUSE: ICD-10-CM

## 2021-07-06 RX ORDER — NALTREXONE HYDROCHLORIDE 50 MG/1
50 TABLET, FILM COATED ORAL DAILY
Qty: 30 TABLET | Refills: 2 | Status: SHIPPED | OUTPATIENT
Start: 2021-07-06 | End: 2021-10-18 | Stop reason: SDUPTHER

## 2021-07-06 NOTE — TELEPHONE ENCOUNTER
----- Message from Olga Macias sent at 7/5/2021 11:03 AM EDT -----  Regarding: Prescription Question  Contact: [phone]  Hi Dr Candy Chapman,     Can you please call in my naltrexone? I have not had it for a couple days  Thank you       Kaity Peoples

## 2021-07-08 DIAGNOSIS — G47.00 INSOMNIA, UNSPECIFIED TYPE: ICD-10-CM

## 2021-07-08 RX ORDER — TRAZODONE HYDROCHLORIDE 50 MG/1
50 TABLET ORAL
Qty: 30 TABLET | Refills: 1 | Status: SHIPPED | OUTPATIENT
Start: 2021-07-08

## 2021-07-26 ENCOUNTER — TELEPHONE (OUTPATIENT)
Dept: FAMILY MEDICINE CLINIC | Facility: CLINIC | Age: 41
End: 2021-07-26

## 2021-07-26 NOTE — TELEPHONE ENCOUNTER
Lmom pt has visit on 8/19 and needs to r/s, provider had change in schedule and will not be in office

## 2021-09-20 DIAGNOSIS — E03.9 HYPOTHYROIDISM, UNSPECIFIED TYPE: ICD-10-CM

## 2021-09-20 RX ORDER — LEVOTHYROXINE SODIUM 0.03 MG/1
25 TABLET ORAL DAILY
Qty: 90 TABLET | Refills: 0 | Status: SHIPPED | OUTPATIENT
Start: 2021-09-20 | End: 2022-02-08 | Stop reason: SDUPTHER

## 2021-10-01 DIAGNOSIS — F41.9 ANXIETY: ICD-10-CM

## 2021-10-01 RX ORDER — SERTRALINE HYDROCHLORIDE 100 MG/1
TABLET, FILM COATED ORAL
Qty: 30 TABLET | Refills: 0 | Status: SHIPPED | OUTPATIENT
Start: 2021-10-01 | End: 2021-10-25

## 2021-10-18 DIAGNOSIS — F10.11 HISTORY OF ALCOHOL ABUSE: ICD-10-CM

## 2021-10-18 RX ORDER — NALTREXONE HYDROCHLORIDE 50 MG/1
50 TABLET, FILM COATED ORAL DAILY
Qty: 30 TABLET | Refills: 1 | Status: SHIPPED | OUTPATIENT
Start: 2021-10-18 | End: 2022-01-11 | Stop reason: SDUPTHER

## 2021-10-24 DIAGNOSIS — F41.9 ANXIETY: ICD-10-CM

## 2021-10-25 RX ORDER — SERTRALINE HYDROCHLORIDE 100 MG/1
TABLET, FILM COATED ORAL
Qty: 30 TABLET | Refills: 0 | Status: SHIPPED | OUTPATIENT
Start: 2021-10-25 | End: 2021-11-18 | Stop reason: SDUPTHER

## 2021-11-18 DIAGNOSIS — F41.9 ANXIETY: ICD-10-CM

## 2021-11-18 RX ORDER — SERTRALINE HYDROCHLORIDE 100 MG/1
TABLET, FILM COATED ORAL
Qty: 30 TABLET | Refills: 0 | Status: SHIPPED | OUTPATIENT
Start: 2021-11-18 | End: 2022-01-11 | Stop reason: SDUPTHER

## 2021-12-03 ENCOUNTER — TELEPHONE (OUTPATIENT)
Dept: PSYCHIATRY | Facility: CLINIC | Age: 41
End: 2021-12-03

## 2022-01-11 DIAGNOSIS — F10.11 HISTORY OF ALCOHOL ABUSE: ICD-10-CM

## 2022-01-11 DIAGNOSIS — F41.9 ANXIETY: ICD-10-CM

## 2022-01-12 RX ORDER — SERTRALINE HYDROCHLORIDE 100 MG/1
TABLET, FILM COATED ORAL
Qty: 30 TABLET | Refills: 0 | Status: SHIPPED | OUTPATIENT
Start: 2022-01-12 | End: 2022-01-19

## 2022-01-12 RX ORDER — NALTREXONE HYDROCHLORIDE 50 MG/1
50 TABLET, FILM COATED ORAL DAILY
Qty: 30 TABLET | Refills: 0 | Status: SHIPPED | OUTPATIENT
Start: 2022-01-12 | End: 2022-01-18

## 2022-01-12 NOTE — TELEPHONE ENCOUNTER
Call patient, I refilled her medications for 30 days    But she needs an appointment before further refills can be authorized

## 2022-01-18 DIAGNOSIS — F10.11 HISTORY OF ALCOHOL ABUSE: Primary | ICD-10-CM

## 2022-01-18 RX ORDER — DISULFIRAM 250 MG/1
250 TABLET ORAL DAILY
Qty: 30 TABLET | Refills: 1 | Status: SHIPPED | OUTPATIENT
Start: 2022-01-18 | End: 2022-03-03 | Stop reason: SDUPTHER

## 2022-01-19 DIAGNOSIS — F41.9 ANXIETY: ICD-10-CM

## 2022-01-19 RX ORDER — SERTRALINE HYDROCHLORIDE 100 MG/1
TABLET, FILM COATED ORAL
Qty: 45 TABLET | Refills: 0 | Status: SHIPPED | OUTPATIENT
Start: 2022-01-19 | End: 2022-02-14 | Stop reason: SDUPTHER

## 2022-02-08 ENCOUNTER — TELEPHONE (OUTPATIENT)
Dept: FAMILY MEDICINE CLINIC | Facility: CLINIC | Age: 42
End: 2022-02-08

## 2022-02-08 DIAGNOSIS — E03.9 HYPOTHYROIDISM, UNSPECIFIED TYPE: ICD-10-CM

## 2022-02-08 RX ORDER — LEVOTHYROXINE SODIUM 0.03 MG/1
25 TABLET ORAL DAILY
Qty: 30 TABLET | Refills: 0 | Status: SHIPPED | OUTPATIENT
Start: 2022-02-08 | End: 2022-03-03 | Stop reason: SDUPTHER

## 2022-02-08 NOTE — TELEPHONE ENCOUNTER
Called pt, made appt for 3/3 for med check  Pt asked if levothyroxine refill could be sent to pharmacy, has no refills left   Thank you

## 2022-02-14 DIAGNOSIS — F41.9 ANXIETY: ICD-10-CM

## 2022-02-14 RX ORDER — SERTRALINE HYDROCHLORIDE 100 MG/1
TABLET, FILM COATED ORAL
Qty: 45 TABLET | Refills: 0 | Status: SHIPPED | OUTPATIENT
Start: 2022-02-14 | End: 2022-03-14 | Stop reason: SDUPTHER

## 2022-03-03 ENCOUNTER — OFFICE VISIT (OUTPATIENT)
Dept: FAMILY MEDICINE CLINIC | Facility: CLINIC | Age: 42
End: 2022-03-03
Payer: COMMERCIAL

## 2022-03-03 VITALS
WEIGHT: 134 LBS | HEART RATE: 75 BPM | DIASTOLIC BLOOD PRESSURE: 80 MMHG | SYSTOLIC BLOOD PRESSURE: 120 MMHG | HEIGHT: 62 IN | RESPIRATION RATE: 14 BRPM | OXYGEN SATURATION: 99 % | BODY MASS INDEX: 24.66 KG/M2 | TEMPERATURE: 97.7 F

## 2022-03-03 DIAGNOSIS — E03.9 HYPOTHYROIDISM, UNSPECIFIED TYPE: ICD-10-CM

## 2022-03-03 DIAGNOSIS — F10.11 HISTORY OF ALCOHOL ABUSE: Primary | ICD-10-CM

## 2022-03-03 DIAGNOSIS — Z13.220 SCREENING CHOLESTEROL LEVEL: ICD-10-CM

## 2022-03-03 DIAGNOSIS — G47.00 INSOMNIA, UNSPECIFIED TYPE: ICD-10-CM

## 2022-03-03 DIAGNOSIS — Z13.1 SCREENING FOR DIABETES MELLITUS: ICD-10-CM

## 2022-03-03 DIAGNOSIS — F41.9 ANXIETY: ICD-10-CM

## 2022-03-03 DIAGNOSIS — N39.3 STRESS INCONTINENCE: ICD-10-CM

## 2022-03-03 DIAGNOSIS — Z13.0 SCREENING FOR DEFICIENCY ANEMIA: ICD-10-CM

## 2022-03-03 DIAGNOSIS — E04.1 THYROID NODULE: ICD-10-CM

## 2022-03-03 PROBLEM — N92.0 MENORRHAGIA: Status: RESOLVED | Noted: 2018-01-18 | Resolved: 2022-03-03

## 2022-03-03 PROBLEM — N84.0 UTERINE POLYP: Status: RESOLVED | Noted: 2017-02-08 | Resolved: 2022-03-03

## 2022-03-03 PROCEDURE — 1036F TOBACCO NON-USER: CPT | Performed by: FAMILY MEDICINE

## 2022-03-03 PROCEDURE — 99214 OFFICE O/P EST MOD 30 MIN: CPT | Performed by: FAMILY MEDICINE

## 2022-03-03 PROCEDURE — 3725F SCREEN DEPRESSION PERFORMED: CPT | Performed by: FAMILY MEDICINE

## 2022-03-03 PROCEDURE — 3008F BODY MASS INDEX DOCD: CPT | Performed by: FAMILY MEDICINE

## 2022-03-03 RX ORDER — VENLAFAXINE HYDROCHLORIDE 75 MG/1
75 CAPSULE, EXTENDED RELEASE ORAL
Qty: 90 CAPSULE | Refills: 1 | Status: SHIPPED | OUTPATIENT
Start: 2022-03-03 | End: 2022-03-07 | Stop reason: SDUPTHER

## 2022-03-03 RX ORDER — DISULFIRAM 250 MG/1
250 TABLET ORAL DAILY
Qty: 90 TABLET | Refills: 1 | Status: SHIPPED | OUTPATIENT
Start: 2022-03-03

## 2022-03-03 RX ORDER — LEVOTHYROXINE SODIUM 0.03 MG/1
25 TABLET ORAL DAILY
Qty: 30 TABLET | Refills: 0 | Status: SHIPPED | OUTPATIENT
Start: 2022-03-03 | End: 2022-03-28 | Stop reason: SDUPTHER

## 2022-03-03 NOTE — ASSESSMENT & PLAN NOTE
History of alcohol abuse  Patient was in detox and rehab November 2020  Originally on naltrexone  Patient was recently switched to Antabuse 250 mg daily  She is doing well on this  She has not had any alcohol since her hospitalization

## 2022-03-03 NOTE — PROGRESS NOTES
[facility]      NAME: Yazmin Son  AGE: 46 y o  SEX: female  : 1980   MRN: [id number]    DATE: 3/3/2022  TIME: 5:22 PM    Assessment and Plan     Problem List Items Addressed This Visit     Thyroid nodule     History of thyroid nodule  Patient is also status post thyroid biopsy  Last ultrasound in 2019 showed continued stability  Will repeat ultrasound in near future  If stable, no need for further surveillance         Relevant Orders    US thyroid    Hypothyroidism     Doing well on levothyroxine 25 mcg daily  She is overdue for labs  Rx given  Will call with results         Relevant Orders    TSH, 3rd generation with Free T4 reflex    Anxiety     Patient has been on sertraline 150 mg daily for quite some time  She does not think med is working as well as it used to  She is requesting a change  Will wean down off sertraline as follows; 100 mg daily for 7 days, 50 mg daily for 7 days, then discontinue  Will start venlafaxine 75 mg daily  Call if further problems         Relevant Medications    venlafaxine (EFFEXOR-XR) 75 mg 24 hr capsule    Insomnia     Trazodone 25-50 mg works well, but unfortunately does give her some degree of next a hangover effect  She does not take medication every night  She has also tried melatonin without benefit  I advised patient to take med a little earlier in the evening to see if this helps  If side effects persist, consider change to Sinequan or hydroxyzine         History of alcohol abuse - Primary     History of alcohol abuse  Patient was in detox and rehab 2020  Originally on naltrexone  Patient was recently switched to Antabuse 250 mg daily  She is doing well on this  She has not had any alcohol since her hospitalization  Relevant Medications    disulfiram (ANTABUSE) 250 mg tablet    Stress incontinence     Patient complaining of issues with stress incontinence    Will refer to urogyn         Relevant Orders Ambulatory Referral to Urogynecology      Other Visit Diagnoses     Screening for deficiency anemia        Relevant Orders    CBC and differential    Screening cholesterol level        Relevant Orders    Lipid Panel with Direct LDL reflex    Screening for diabetes mellitus        Relevant Orders    Comprehensive metabolic panel          Rx given for routine fasting blood work  Will call with results    Return to office in:  6 months    Chief Complaint     Chief Complaint   Patient presents with    Follow-up     meds check up       History of Present Illness     Patient presents recheck chronic medical problems today  The following portions of the patient's history were reviewed and updated as appropriate: allergies, current medications, past family history, past medical history, past social history, past surgical history and problem list     Review of Systems   Review of Systems   Respiratory: Negative  Cardiovascular: Negative  Gastrointestinal: Negative  Genitourinary: Negative  Active Problem List     Patient Active Problem List   Diagnosis    Thyroid nodule    Vitamin D deficiency    Hypothyroidism    Mixed emotional features as adjustment reaction    Amenorrhea    Anxiety    Female infertility    MARIANO (generalized anxiety disorder)    Lymphadenopathy    MDD (major depressive disorder), recurrent episode, mild (HCC)    Raynaud's phenomenon    Synovial cyst of left popliteal space    Insomnia    Restless leg syndrome    History of alcohol abuse    Stress incontinence       Objective   /80 (BP Location: Left arm, Patient Position: Sitting, Cuff Size: Adult)   Pulse 75   Temp 97 7 °F (36 5 °C) (Temporal)   Resp 14   Ht 5' 2 21" (1 58 m)   Wt 60 8 kg (134 lb)   LMP 02/03/2022   SpO2 99%   BMI 24 35 kg/m²     Physical Exam  Cardiovascular:      Rate and Rhythm: Normal rate and regular rhythm  Heart sounds: Normal heart sounds        Comments: Carotids: no bruits  Ext: no edema  Pulmonary:      Effort: Pulmonary effort is normal  No respiratory distress  Breath sounds: No wheezing or rales  Psychiatric:         Behavior: Behavior normal          Thought Content:  Thought content normal          Pertinent Laboratory/Diagnostic Studies:  Last labs reviewed    Current Medications     Current Outpatient Medications:     disulfiram (ANTABUSE) 250 mg tablet, Take 1 tablet (250 mg total) by mouth daily, Disp: 90 tablet, Rfl: 1    levothyroxine 25 mcg tablet, Take 1 tablet (25 mcg total) by mouth daily, Disp: 30 tablet, Rfl: 0    sertraline (ZOLOFT) 100 mg tablet, take 1 1/2 tabs daily, Disp: 45 tablet, Rfl: 0    traZODone (DESYREL) 50 mg tablet, Take 1 tablet (50 mg total) by mouth daily at bedtime, Disp: 30 tablet, Rfl: 1    venlafaxine (EFFEXOR-XR) 75 mg 24 hr capsule, Take 1 capsule (75 mg total) by mouth daily with breakfast, Disp: 90 capsule, Rfl: 1    Health Maintenance     Health Maintenance   Topic Date Due    Hepatitis C Screening  Never done    Pneumococcal Vaccine: Pediatrics (0 to 5 Years) and At-Risk Patients (6 to 59 Years) (1 of 2 - PPSV23) Never done    Annual Physical  Never done    Cervical Cancer Screening  Never done    COVID-19 Vaccine (3 - Booster for Moderna series) 07/18/2021    Influenza Vaccine (1) 09/01/2021    BMI: Adult  03/03/2023    Depression Remission PHQ  03/03/2023    DTaP,Tdap,and Td Vaccines (4 - Td or Tdap) 08/28/2027    HIV Screening  Completed    HIB Vaccine  Aged Out    Hepatitis B Vaccine  Aged Out    IPV Vaccine  Aged Out    Hepatitis A Vaccine  Aged Out    Meningococcal ACWY Vaccine  Aged Out    HPV Vaccine  Aged Out     Immunization History   Administered Date(s) Administered    COVID-19 MODERNA VACC 0 5 ML IM 01/20/2021, 02/18/2021    Hep B, adult 03/29/2013    INFLUENZA 09/01/2012, 09/25/2014, 12/24/2015, 09/01/2016, 09/12/2017    Influenza Quadrivalent Preservative Free 3 years and older IM 09/25/2014    Influenza Quadrivalent, 6-35 Months IM 12/24/2015, 09/01/2016, 09/01/2017    Influenza, injectable, quadrivalent, preservative free 0 5 mL 10/09/2019, 10/29/2020    Influenza, recombinant, quadrivalent,injectable, preservative free 09/24/2018    Tdap 01/01/2011, 06/14/2013, 08/28/2017    Tuberculin Skin Test-PPD Intradermal 03/22/2013       Jordin Diane DO  [facility]

## 2022-03-03 NOTE — ASSESSMENT & PLAN NOTE
History of thyroid nodule  Patient is also status post thyroid biopsy  Last ultrasound in 2019 showed continued stability  Will repeat ultrasound in near future    If stable, no need for further surveillance

## 2022-03-03 NOTE — ASSESSMENT & PLAN NOTE
Patient has been on sertraline 150 mg daily for quite some time  She does not think med is working as well as it used to  She is requesting a change  Will wean down off sertraline as follows; 100 mg daily for 7 days, 50 mg daily for 7 days, then discontinue  Will start venlafaxine 75 mg daily    Call if further problems

## 2022-03-03 NOTE — ASSESSMENT & PLAN NOTE
Trazodone 25-50 mg works well, but unfortunately does give her some degree of next a hangover effect  She does not take medication every night  She has also tried melatonin without benefit  I advised patient to take med a little earlier in the evening to see if this helps    If side effects persist, consider change to Sinequan or hydroxyzine

## 2022-03-03 NOTE — ASSESSMENT & PLAN NOTE
Doing well on levothyroxine 25 mcg daily  She is overdue for labs  Rx given    Will call with results

## 2022-03-03 NOTE — PATIENT INSTRUCTIONS
1) start venlafaxine 75 mg once daily  2) start wean of zoloft as follows:      Week 1: reduce to 1 tab daily      Week 2: reduce to 1/2 tab daily      Then stop

## 2022-03-07 DIAGNOSIS — R74.01 TRANSAMINITIS: Primary | ICD-10-CM

## 2022-03-07 DIAGNOSIS — F41.9 ANXIETY: ICD-10-CM

## 2022-03-07 RX ORDER — VENLAFAXINE HYDROCHLORIDE 75 MG/1
75 CAPSULE, EXTENDED RELEASE ORAL
Qty: 90 CAPSULE | Refills: 1 | Status: SHIPPED | OUTPATIENT
Start: 2022-03-07

## 2022-03-14 DIAGNOSIS — F41.9 ANXIETY: ICD-10-CM

## 2022-03-14 RX ORDER — SERTRALINE HYDROCHLORIDE 100 MG/1
TABLET, FILM COATED ORAL
Qty: 45 TABLET | Refills: 5 | Status: SHIPPED | OUTPATIENT
Start: 2022-03-14

## 2022-03-16 ENCOUNTER — HOSPITAL ENCOUNTER (OUTPATIENT)
Dept: ULTRASOUND IMAGING | Facility: MEDICAL CENTER | Age: 42
Discharge: HOME | End: 2022-03-16
Payer: COMMERCIAL

## 2022-03-16 DIAGNOSIS — E04.1 THYROID NODULE: ICD-10-CM

## 2022-03-16 PROCEDURE — 76536 US EXAM OF HEAD AND NECK: CPT

## 2022-03-17 ENCOUNTER — VBI (OUTPATIENT)
Dept: ADMINISTRATIVE | Facility: OTHER | Age: 42
End: 2022-03-17

## 2022-03-21 ENCOUNTER — HOSPITAL ENCOUNTER (OUTPATIENT)
Dept: ULTRASOUND IMAGING | Facility: MEDICAL CENTER | Age: 42
Discharge: HOME | End: 2022-03-21
Payer: COMMERCIAL

## 2022-03-21 DIAGNOSIS — R74.01 TRANSAMINITIS: ICD-10-CM

## 2022-03-21 PROCEDURE — 76705 ECHO EXAM OF ABDOMEN: CPT

## 2022-03-26 DIAGNOSIS — E03.9 HYPOTHYROIDISM, UNSPECIFIED TYPE: Primary | ICD-10-CM

## 2022-03-26 DIAGNOSIS — R74.01 TRANSAMINITIS: ICD-10-CM

## 2022-03-28 DIAGNOSIS — E03.9 HYPOTHYROIDISM, UNSPECIFIED TYPE: ICD-10-CM

## 2022-03-28 RX ORDER — LEVOTHYROXINE SODIUM 0.03 MG/1
25 TABLET ORAL DAILY
Qty: 90 TABLET | Refills: 1 | Status: SHIPPED | OUTPATIENT
Start: 2022-03-28 | End: 2022-03-28 | Stop reason: SDUPTHER

## 2022-03-28 RX ORDER — LEVOTHYROXINE SODIUM 0.03 MG/1
25 TABLET ORAL DAILY
Qty: 30 TABLET | Refills: 2 | Status: SHIPPED | OUTPATIENT
Start: 2022-03-28 | End: 2022-07-12 | Stop reason: SDUPTHER

## 2022-06-22 ENCOUNTER — AMB VIDEO VISIT (OUTPATIENT)
Dept: OTHER | Facility: HOSPITAL | Age: 42
End: 2022-06-22
Payer: COMMERCIAL

## 2022-06-22 DIAGNOSIS — U07.1 COVID: Primary | ICD-10-CM

## 2022-06-22 PROBLEM — F41.9 ANXIETY: Status: RESOLVED | Noted: 2017-12-06 | Resolved: 2022-06-22

## 2022-06-22 PROBLEM — F43.29 MIXED EMOTIONAL FEATURES AS ADJUSTMENT REACTION: Status: RESOLVED | Noted: 2017-11-28 | Resolved: 2022-06-22

## 2022-06-22 PROCEDURE — 99212 OFFICE O/P EST SF 10 MIN: CPT | Performed by: PHYSICIAN ASSISTANT

## 2022-06-23 ENCOUNTER — AMB VIDEO VISIT (OUTPATIENT)
Dept: OTHER | Facility: HOSPITAL | Age: 42
End: 2022-06-23

## 2022-06-23 NOTE — PATIENT INSTRUCTIONS
Please Be Courteous:  You have COVID-19  You need to self quarantine for at least 5 days from symptom onset  This means go home and stay home  Have someone else  anything you made need and bring it to you and drop it at your door  In Your Home:  If you live with other people, trying to avoid common spaces and disinfect areas that you come into contact with  Per the CDC's recommendations: Use a separate bedroom and bathroom if feasible  If If other people in your home are concerned they may have covid, isolation should begin even before seeking testing and before test results become available  All household members should start wearing a mask in the home, particularly in shared spaces where appropriate distancing is not possible  Take Care of Yourself:  Schedule a virtual visit with your primary care physician as soon as possible  Try to sleep on your stomach as much as possible  If you have the ability to, take vitamin D3 2000 IU daily, vitamin-C 1000 mg twice a day, a multivitamin daily to help boost your immune system  You should check your temperature twice day  Go to the emergency department for any severe shortness of breath, chest pain or pulse ox less than 90%  Updated Dec 27, 2021  If You Test Positive for COVID-19 (Isolate)    Everyone, regardless of vaccination status:    Stay home for 5 days  If you have no symptoms or your symptoms are resolving after 5 days, you can leave your house  Continue to wear a mask around others for 5 additional days  If you have a fever, continue to stay home until your fever resolves  How to Recover from COVID-19 at [address]:   What you need to know about recovering from COVID-19 at home:  COVID-19 can cause a range of symptoms, from mild to severe  If you do not need to be treated in a hospital, you will be given instructions to use at home  You will need to watch for worsening symptoms and seek immediate care if needed   You will also need to stay physically apart from others so you do not spread the virus to anyone  It is not known if a person can be infected with the virus again after recovering from COVID-19  It is also not known if or for how long the virus can continue to be passed to others  Call your local emergency number [phone] in the [facility]) or an emergency department if:   You have trouble breathing or shortness of breath at rest     You have chest pain or pressure that lasts longer than 5 minutes  You become confused or hard to wake  Your lips or face are blue  Seek care immediately if:   You have a fever of 104°F (40°C) or higher  Call your doctor if:   You have new, returning, or worsening symptoms  Someone in your home has symptoms of COVID-19  You have questions or concerns about your condition or care  Self-care:  Mild symptoms may get better on their own  The following may be used to manage your symptoms:  Decongestants  help reduce nasal congestion and help you breathe more easily  If you take decongestant pills, they may make you feel restless or cause problems with your sleep  Do not use decongestant sprays for more than a few days  Cough suppressants  help reduce coughing  Ask your healthcare provider which type of cough medicine is best for you  To soothe a sore throat,  gargle with warm salt water, or use throat lozenges or a throat spray  Drink more liquids to thin and loosen mucus and to prevent dehydration  Use decongestants or saline drops as directed for nasal congestion  NSAIDs , such as ibuprofen, help decrease swelling, pain, and fever  NSAIDs can cause stomach bleeding or kidney problems in certain people  If you take blood thinner medicine, always ask your healthcare provider if NSAIDs are safe for you  Always read the medicine label and follow directions  Acetaminophen  decreases pain and fever  It is available without a doctor's order   Ask how much to take and how often to take it  Follow directions  Read the labels of all other medicines you are using to see if they also contain acetaminophen, or ask your doctor or pharmacist  Acetaminophen can cause liver damage if not taken correctly  Do not use more than 4 grams (4,000 milligrams) total of acetaminophen in one day  Keep others safe while you are recovering at home:  Healthcare providers will give you specific instructions to follow  The following are general guidelines to remind you how to keep others safe until you are well:  Wash your hands often  Use soap and water as much as possible  You can use hand  that contains alcohol if soap and water are not available  Do not share towels with anyone  If you use paper towels, throw them away in a lined trash can kept in your room or area  Use a covered trash can, if possible  Cover sneezes and coughs  Turn your face away and cover your mouth and nose with a tissue  Throw the tissue away  Use the bend of your arm if a tissue is not available  Then wash your hands well with soap and water or use hand   Wear a face covering (mask) around others  Use a cloth covering with at least 2 layers  You can also create layers by putting a cloth covering over a disposable non-medical mask  Cover your mouth and your nose  The covering should fit snugly against the bridge of your nose  Securely fasten it under your chin and on the sides of your face  Do not go out of your home unless it is necessary  If possible, ask someone who is not infected to go out for groceries, medicines, and household items  Ask your healthcare provider for other ways to have appointments  Some providers offer phone, video, or other types of appointments  If you need to be seen in person, call ahead to make sure the office will be ready for you  Do not let anyone into your home, room, or area unless it is necessary    If possible, stay in a separate area or room of your home if you live with others  No one should go into the area or room except to give you care  Wear a face covering around others  Remind others to wear face coverings and to wash their hands  Talk to your healthcare provider about your baby  If possible, ask someone who is well to care for your baby  You can put breast milk in bottles for the person to use, if needed  Wear a clean face covering if you need to breastfeed or express or pump breast milk  Tell your provider if you have any questions or concerns about caring for or bonding with your baby  He or she will tell you when to bring your baby in for check-ups and vaccines  He or she will also tell you what to do if you think your baby was infected with the coronavirus  Do not handle live animals unless it is necessary  Until more is known, it is best not to touch, play with, or handle live animals  Some animals, including pets, have been infected with the new coronavirus  Do not handle or care for animals until you are well  Ask someone who is not infected to take care of your pet, if possible  If you must care for a pet, wear a face covering  Wash your hands before and after you give care  Follow directions from your healthcare provider for when you can be around others  Your provider will give you specific instructions  The following are general guidelines:    Do not travel until your provider says it is okay  You will need to wait 10 full days after symptoms started or you got a positive test result  Wait 10 days even if you got a COVID-19 vaccine and a booster  If symptoms never developed,  wait at least 5 days after your positive test  You can be around others if no symptoms start to develop  Wear a face covering around others for another 5 days (10 days total),  or as directed  If mild symptoms developed,  wait at least 5 days after the symptoms began  You can start to be around others if your symptoms are gone or are improving   Wear a face covering around others for another 5 days (10 days total), or as directed  If you still have a fever at 10 days, stay away from others until you are fever-free without medicine  If severe symptoms developed  or you have an immune system problem, wait at least 10 days after symptoms appeared  Then contact your provider  He or she will tell you if it is okay to be around others or continue to wait  Wear a face covering around others for another 5 days (15 days total), or as directed  If you were hospitalized for COVID-19 and needed oxygen,  your provider will tell you how long to wait before you can be around others  Then continue social distancing and wearing a face covering for as long as directed  Follow up with your doctor as directed:  Write down your questions so you remember to ask them during your visits  For more information:   [facility]  [address] , [address]  Phone: 6- 979 - 938-4310  Web Address: [employer] com br    © Copyright [employer] 2022 Information is for End User's use only and may not be sold, redistributed or otherwise used for commercial purposes  All illustrations and images included in CareNotes® are the copyrighted property of [employer]  or [address] [place], [place] [address]irma Barragan   The above information is an  only  It is not intended as medical advice for individual conditions or treatments  Talk to your doctor, nurse or pharmacist before following any medical regimen to see if it is safe and effective for you

## 2022-06-23 NOTE — CARE ANYWHERE EVISITS
Visit Summary for [place] Anniece Snellen - Gender: Female - Date of Birth: [id number]  Date: [id number] - Duration: 10 minutes  Patient: [place] Anniece Snellen  Provider: Carly Paredes PA-C    Patient Contact Information  Address  [address] [place] [address]; [address] [place] [address]  [phone]    Visit Topics  Covid positive and miserable  [Added By: Self - 2022-06-23]    Triage Questions   What is your current physical address in the event of a medical emergency? Answer []  Are you allergic to any medications? Answer []  Are you now or could you be pregnant? Answer []  Do you have any immune system compromise or chronic lung   disease? Answer []  Do you have any vulnerable family members in the home (infant, pregnant, cancer, elderly)? Answer []     Conversation Transcripts  [0A][0A] [Notification] You are connected with Carly Paredes PA-C, Urgent Care Specialist [0A][Notification] Isabela Andre is located in [place]  [0A][Notification] Isabela Andre has shared health history  Celi Cecil  [0A]    Diagnosis  COVID-19    Procedures  Value: [id number] Code: CPT-4 UNLISTED E&M SERVICE    Medications Prescribed    No prescriptions ordered    Electronically signed by: Little Sloan(NPI [id number])

## 2022-06-23 NOTE — PROGRESS NOTES
Video Visit - Sabrina Erickson 46 y o  female MRN: [id number]    REQUIRED DOCUMENTATION:         1  This service was provided via [employer]  2  Provider located at [address] [place] [id number]  3  [facility] provider: Aileen Alexandra PA-C   4  Identify all parties in room with patient during [facility] visit:  significant other-permission granted and child(garfield)- permission granted  5  After connecting through [employer], patient was identified by name and date of birth  Patient was then informed that this was a Telemedicine visit and that the exam was being conducted confidentially over secure lines  My office door was closed  No one else was in the room  Patient acknowledged consent and understanding of privacy and security of the Telemedicine visit  I informed the patient that I have reviewed their record in Epic and presented the opportunity for them to ask any questions regarding the visit today  The patient agreed to participate  VITALS: Heart Rate: 65 BPM, Respiratory Rate: 18 RPM, Temperature 99 7° F, Blood Pressure Unavailable mmHg, Pulse Ox 97 % on RA    HPI  Pt reports return from [place] yesterday  2 days ago developed body aches, fevers  COVID positive as of yesterday  Reports HA, productive cough, chest pain with coughing, malaise  Taking tylenol and ibuprofen and fluids  Vit D and zinc  Vaccinated x 3  Wanted to discuss COVID drugs  Physical Exam  Constitutional:       General: She is not in acute distress  Appearance: Normal appearance  She is not toxic-appearing  HENT:      Head: Normocephalic and atraumatic  Nose: No rhinorrhea  Mouth/Throat:      Mouth: Mucous membranes are moist    Eyes:      Conjunctiva/sclera: Conjunctivae normal    Cardiovascular:      Rate and Rhythm: Normal rate  Pulmonary:      Effort: Pulmonary effort is normal  No respiratory distress  Breath sounds: No wheezing (no gross audible wheeze through computer)  Musculoskeletal:      Cervical back: Normal range of motion  Skin:     Findings: No rash (on face or neck)  Neurological:      Mental Status: She is alert  Cranial Nerves: No dysarthria or facial asymmetry  Psychiatric:         Mood and Affect: Mood normal          Behavior: Behavior normal          Diagnoses and all orders for this visit:    COVID      Patient Instructions     Please Be Courteous:  You have COVID-19  You need to self quarantine for at least 5 days from symptom onset  This means go home and stay home  Have someone else  anything you made need and bring it to you and drop it at your door  In Your Home:  If you live with other people, trying to avoid common spaces and disinfect areas that you come into contact with  Per the CDC's recommendations: Use a separate bedroom and bathroom if feasible  If If other people in your home are concerned they may have covid, isolation should begin even before seeking testing and before test results become available  All household members should start wearing a mask in the home, particularly in shared spaces where appropriate distancing is not possible  Take Care of Yourself:  Schedule a virtual visit with your primary care physician as soon as possible  Try to sleep on your stomach as much as possible  If you have the ability to, take vitamin D3 2000 IU daily, vitamin-C 1000 mg twice a day, a multivitamin daily to help boost your immune system  You should check your temperature twice day  Go to the emergency department for any severe shortness of breath, chest pain or pulse ox less than 90%  Updated Dec 27, 2021  If You Test Positive for COVID-19 (Isolate)    Everyone, regardless of vaccination status:    · Stay home for 5 days  · If you have no symptoms or your symptoms are resolving after 5 days, you can leave your house  · Continue to wear a mask around others for 5 additional days      If you have a fever, continue to stay home until your fever resolves  How to Recover from COVID-19 at [address]:   What you need to know about recovering from COVID-19 at home:  COVID-19 can cause a range of symptoms, from mild to severe  If you do not need to be treated in a hospital, you will be given instructions to use at home  You will need to watch for worsening symptoms and seek immediate care if needed  You will also need to stay physically apart from others so you do not spread the virus to anyone  It is not known if a person can be infected with the virus again after recovering from COVID-19  It is also not known if or for how long the virus can continue to be passed to others  Call your local emergency number (911 in the [address] [place] [address],[address]) or an emergency department if:   · You have trouble breathing or shortness of breath at rest     · You have chest pain or pressure that lasts longer than 5 minutes  · You become confused or hard to wake  · Your lips or face are blue  Seek care immediately if:   · You have a fever of 104°F (40°C) or higher  Call your doctor if:   · You have new, returning, or worsening symptoms  · Someone in your home has symptoms of COVID-19  · You have questions or concerns about your condition or care  Self-care:  Mild symptoms may get better on their own  The following may be used to manage your symptoms:  · Decongestants  help reduce nasal congestion and help you breathe more easily  If you take decongestant pills, they may make you feel restless or cause problems with your sleep  Do not use decongestant sprays for more than a few days  · Cough suppressants  help reduce coughing  Ask your healthcare provider which type of cough medicine is best for you  · To soothe a sore throat,  gargle with warm salt water, or use throat lozenges or a throat spray  Drink more liquids to thin and loosen mucus and to prevent dehydration   Use decongestants or saline drops as directed for nasal congestion  · NSAIDs , such as ibuprofen, help decrease swelling, pain, and fever  NSAIDs can cause stomach bleeding or kidney problems in certain people  If you take blood thinner medicine, always ask your healthcare provider if NSAIDs are safe for you  Always read the medicine label and follow directions  · Acetaminophen  decreases pain and fever  It is available without a doctor's order  Ask how much to take and how often to take it  Follow directions  Read the labels of all other medicines you are using to see if they also contain acetaminophen, or ask your doctor or pharmacist  Acetaminophen can cause liver damage if not taken correctly  Do not use more than 4 grams (4,000 milligrams) total of acetaminophen in one day  Keep others safe while you are recovering at home:  Healthcare providers will give you specific instructions to follow  The following are general guidelines to remind you how to keep others safe until you are well:  1  Wash your hands often  Use soap and water as much as possible  You can use hand  that contains alcohol if soap and water are not available  Do not share towels with anyone  If you use paper towels, throw them away in a lined trash can kept in your room or area  Use a covered trash can, if possible  2  Cover sneezes and coughs  Turn your face away and cover your mouth and nose with a tissue  Throw the tissue away  Use the bend of your arm if a tissue is not available  Then wash your hands well with soap and water or use hand   3  Wear a face covering (mask) around others  Use a cloth covering with at least 2 layers  You can also create layers by putting a cloth covering over a disposable non-medical mask  Cover your mouth and your nose  The covering should fit snugly against the bridge of your nose  Securely fasten it under your chin and on the sides of your face  4  Do not go out of your home unless it is necessary    If possible, ask someone who is not infected to go out for groceries, medicines, and household items  Ask your healthcare provider for other ways to have appointments  Some providers offer phone, video, or other types of appointments  If you need to be seen in person, call ahead to make sure the office will be ready for you  5  Do not let anyone into your home, room, or area unless it is necessary  If possible, stay in a separate area or room of your home if you live with others  No one should go into the area or room except to give you care  Wear a face covering around others  Remind others to wear face coverings and to wash their hands  6  Talk to your healthcare provider about your baby  If possible, ask someone who is well to care for your baby  You can put breast milk in bottles for the person to use, if needed  Wear a clean face covering if you need to breastfeed or express or pump breast milk  Tell your provider if you have any questions or concerns about caring for or bonding with your baby  He or she will tell you when to bring your baby in for check-ups and vaccines  He or she will also tell you what to do if you think your baby was infected with the coronavirus  7  Do not handle live animals unless it is necessary  Until more is known, it is best not to touch, play with, or handle live animals  Some animals, including pets, have been infected with the new coronavirus  Do not handle or care for animals until you are well  Ask someone who is not infected to take care of your pet, if possible  If you must care for a pet, wear a face covering  Wash your hands before and after you give care  8  Follow directions from your healthcare provider for when you can be around others  Your provider will give you specific instructions  The following are general guidelines:    ? Do not travel until your provider says it is okay  You will need to wait 10 full days after symptoms started or you got a positive test result  Wait 10 days even if you got a COVID-19 vaccine and a booster  ? If symptoms never developed,  wait at least 5 days after your positive test  You can be around others if no symptoms start to develop  Wear a face covering around others for another 5 days (10 days total),  or as directed  ? If mild symptoms developed,  wait at least 5 days after the symptoms began  You can start to be around others if your symptoms are gone or are improving  Wear a face covering around others for another 5 days (10 days total), or as directed  If you still have a fever at 10 days, stay away from others until you are fever-free without medicine  ? If severe symptoms developed  or you have an immune system problem, wait at least 10 days after symptoms appeared  Then contact your provider  He or she will tell you if it is okay to be around others or continue to wait  Wear a face covering around others for another 5 days (15 days total), or as directed  ? If you were hospitalized for COVID-19 and needed oxygen,  your provider will tell you how long to wait before you can be around others  Then continue social distancing and wearing a face covering for as long as directed  Follow up with your doctor as directed:  Write down your questions so you remember to ask them during your visits  For more information:   · Centers for Disease Control and Prevention  [address] , [address]  Phone: 4- 346 - 169-7163  Web Address: [employer] com br    © Copyright [employer] 2022 Information is for End User's use only and may not be sold, redistributed or otherwise used for commercial purposes  All illustrations and images included in CareNotes® are the copyrighted property of [employer]  or [facility] Virginie Barragan   The above information is an  only  It is not intended as medical advice for individual conditions or treatments   Talk to your doctor, nurse or pharmacist before following any medical regimen to see if it is safe and effective for you

## 2022-07-12 ENCOUNTER — PATIENT MESSAGE (OUTPATIENT)
Dept: FAMILY MEDICINE CLINIC | Facility: CLINIC | Age: 42
End: 2022-07-12

## 2022-07-12 DIAGNOSIS — E03.9 HYPOTHYROIDISM, UNSPECIFIED TYPE: ICD-10-CM

## 2022-07-13 RX ORDER — LEVOTHYROXINE SODIUM 0.03 MG/1
25 TABLET ORAL DAILY
Qty: 30 TABLET | Refills: 2 | Status: SHIPPED | OUTPATIENT
Start: 2022-07-13

## 2022-09-15 ENCOUNTER — TELEPHONE (OUTPATIENT)
Dept: FAMILY MEDICINE CLINIC | Facility: CLINIC | Age: 42
End: 2022-09-15

## 2022-09-15 NOTE — TELEPHONE ENCOUNTER
Appointment For: Isabella Ryan ([id number])  Visit Type: [facility] NEW PROBLEM VISIT [facility] ([id number])     9/20/2022    3:15 PM  15 mins    Joey Shell DO         [facility]     Patient Comments:  Appointment

## 2022-09-20 ENCOUNTER — OFFICE VISIT (OUTPATIENT)
Dept: FAMILY MEDICINE CLINIC | Facility: CLINIC | Age: 42
End: 2022-09-20
Payer: COMMERCIAL

## 2022-09-20 VITALS
HEIGHT: 62 IN | WEIGHT: 128.6 LBS | HEART RATE: 67 BPM | RESPIRATION RATE: 16 BRPM | DIASTOLIC BLOOD PRESSURE: 78 MMHG | SYSTOLIC BLOOD PRESSURE: 112 MMHG | OXYGEN SATURATION: 99 % | BODY MASS INDEX: 23.66 KG/M2 | TEMPERATURE: 97.3 F

## 2022-09-20 DIAGNOSIS — F10.11 HISTORY OF ALCOHOL ABUSE: ICD-10-CM

## 2022-09-20 DIAGNOSIS — F41.1 GAD (GENERALIZED ANXIETY DISORDER): ICD-10-CM

## 2022-09-20 DIAGNOSIS — E03.9 HYPOTHYROIDISM, UNSPECIFIED TYPE: ICD-10-CM

## 2022-09-20 DIAGNOSIS — Z12.31 ENCOUNTER FOR SCREENING MAMMOGRAM FOR MALIGNANT NEOPLASM OF BREAST: Primary | ICD-10-CM

## 2022-09-20 PROCEDURE — 99214 OFFICE O/P EST MOD 30 MIN: CPT | Performed by: FAMILY MEDICINE

## 2022-09-20 RX ORDER — SERTRALINE HYDROCHLORIDE 100 MG/1
TABLET, FILM COATED ORAL
Qty: 45 TABLET | Refills: 5 | Status: SHIPPED | OUTPATIENT
Start: 2022-09-20

## 2022-09-20 NOTE — PROGRESS NOTES
Assessment/Plan:   1  MARIANO (generalized anxiety disorder)  Patient's mood appears much better controlled today  Will continue with her current dose of Zoloft 150 mg daily  - sertraline (ZOLOFT) 100 mg tablet; take 1 1/2 tabs daily  Dispense: 45 tablet; Refill: 5    2  Hypothyroidism, unspecified type  Unclear of recent control  Recheck thyroid function testing  Continue with her current dose of levothyroxine  3  History of alcohol abuse  Patient has been cleared from all alcohol in the past 8 months  She previously did appear to have minimal elevations in her liver function test   Your she was reassured on this  Will recheck her CMP  Continue with her current dose of disulfiram   Follow up patient in 6 months  4  Encounter for screening mammogram for malignant neoplasm of breast  - Mammo screening bilateral w 3d & cad; Future           Diagnoses and all orders for this visit:    Encounter for screening mammogram for malignant neoplasm of breast    Anxiety          Subjective:       Chief Complaint   Patient presents with    Medication Management      Patient ID: Shara Barrios is a 38 y o  female presents today for a follow-up on her chronic conditions  She has generalized anxiety disorder, hypothyroidism as well as a history of alcohol abuse  She is been taking her medications regularly  She denies adverse reactions with medications  She states that she was previously prescribed Effexor however she did not have any improvement in her mood given this medication  She did switch back to her Zoloft which appears to be much more effective for her  She has been taking her diastolic for him regularly  She states that she is not had any alcohol intake in the past 8-9 months  HPI    Review of Systems   Constitutional: Negative for activity change, chills, fatigue and fever  HENT: Negative for congestion, ear pain, sinus pressure and sore throat      Eyes: Negative for redness, itching and visual disturbance  Respiratory: Negative for cough and shortness of breath  Cardiovascular: Negative for chest pain and palpitations  Gastrointestinal: Negative for abdominal pain, diarrhea and nausea  Endocrine: Negative for cold intolerance and heat intolerance  Genitourinary: Negative for dysuria, flank pain and frequency  Musculoskeletal: Negative for arthralgias, back pain, gait problem and myalgias  Skin: Negative for color change  Allergic/Immunologic: Negative for environmental allergies  Neurological: Negative for dizziness, numbness and headaches  Psychiatric/Behavioral: Negative for behavioral problems and sleep disturbance  The following portions of the patient's history were reviewed and updated as appropriate : past family history, past medical history, past social history and past surgical history  Current Outpatient Medications:     disulfiram (ANTABUSE) 250 mg tablet, TAKE 1 TABLET DAILY, Disp: 90 tablet, Rfl: 1    levothyroxine 25 mcg tablet, Take 1 tablet (25 mcg total) by mouth daily, Disp: 30 tablet, Rfl: 2    sertraline (ZOLOFT) 100 mg tablet, take 1 1/2 tabs daily, Disp: 45 tablet, Rfl: 5    traZODone (DESYREL) 50 mg tablet, Take 1 tablet (50 mg total) by mouth daily at bedtime, Disp: 30 tablet, Rfl: 1         Objective:         Vitals:    09/20/22 1515   BP: 112/78   Pulse: 67   Resp: 16   Temp: (!) 97 3 °F (36 3 °C)   TempSrc: Tympanic   SpO2: 99%   Weight: 58 3 kg (128 lb 9 6 oz)   Height: 5' 2" (1 575 m)     Physical Exam  Vitals reviewed  Constitutional:       Appearance: She is well-developed  HENT:      Head: Normocephalic and atraumatic  Nose: Nose normal       Mouth/Throat:      Pharynx: No oropharyngeal exudate  Eyes:      General: No scleral icterus  Right eye: No discharge  Left eye: No discharge  Pupils: Pupils are equal, round, and reactive to light  Neck:      Trachea: No tracheal deviation     Cardiovascular:      Rate and Rhythm: Normal rate and regular rhythm  Pulses:           Dorsalis pedis pulses are 2+ on the right side and 2+ on the left side  Posterior tibial pulses are 2+ on the right side and 2+ on the left side  Heart sounds: Normal heart sounds  No murmur heard  No friction rub  No gallop  Pulmonary:      Effort: Pulmonary effort is normal  No respiratory distress  Breath sounds: Normal breath sounds  No wheezing or rales  Abdominal:      General: Bowel sounds are normal  There is no distension  Palpations: Abdomen is soft  Tenderness: There is no abdominal tenderness  There is no guarding or rebound  Musculoskeletal:         General: Normal range of motion  Cervical back: Normal range of motion and neck supple  Lymphadenopathy:      Head:      Right side of head: No submental or submandibular adenopathy  Left side of head: No submental or submandibular adenopathy  Cervical: No cervical adenopathy  Right cervical: No superficial, deep or posterior cervical adenopathy  Left cervical: No superficial, deep or posterior cervical adenopathy  Skin:     General: Skin is warm and dry  Findings: No erythema  Neurological:      Mental Status: She is alert and oriented to person, place, and time  Cranial Nerves: No cranial nerve deficit  Sensory: No sensory deficit  Psychiatric:         Mood and Affect: Mood is not anxious or depressed  Speech: Speech normal          Behavior: Behavior normal          Thought Content:  Thought content normal          Judgment: Judgment normal

## 2022-11-09 ENCOUNTER — HOSPITAL ENCOUNTER (OUTPATIENT)
Dept: MAMMOGRAPHY | Facility: MEDICAL CENTER | Age: 42
Discharge: HOME | End: 2022-11-09

## 2022-11-09 VITALS — HEIGHT: 62 IN | WEIGHT: 128.53 LBS | BODY MASS INDEX: 23.65 KG/M2

## 2022-11-09 DIAGNOSIS — Z12.31 ENCOUNTER FOR SCREENING MAMMOGRAM FOR MALIGNANT NEOPLASM OF BREAST: ICD-10-CM

## 2022-12-06 DIAGNOSIS — F41.1 GAD (GENERALIZED ANXIETY DISORDER): ICD-10-CM

## 2022-12-06 DIAGNOSIS — E03.9 HYPOTHYROIDISM, UNSPECIFIED TYPE: ICD-10-CM

## 2022-12-06 RX ORDER — SERTRALINE HYDROCHLORIDE 100 MG/1
TABLET, FILM COATED ORAL
Qty: 45 TABLET | Refills: 0 | Status: SHIPPED | OUTPATIENT
Start: 2022-12-06

## 2022-12-07 RX ORDER — LEVOTHYROXINE SODIUM 0.03 MG/1
25 TABLET ORAL DAILY
Qty: 30 TABLET | Refills: 0 | Status: SHIPPED | OUTPATIENT
Start: 2022-12-07

## 2022-12-16 ENCOUNTER — VBI (OUTPATIENT)
Dept: ADMINISTRATIVE | Facility: OTHER | Age: 42
End: 2022-12-16

## 2022-12-22 DIAGNOSIS — F41.1 GAD (GENERALIZED ANXIETY DISORDER): ICD-10-CM

## 2022-12-22 RX ORDER — SERTRALINE HYDROCHLORIDE 100 MG/1
TABLET, FILM COATED ORAL
Qty: 90 TABLET | Refills: 1 | Status: SHIPPED | OUTPATIENT
Start: 2022-12-22 | End: 2022-12-28 | Stop reason: SDUPTHER

## 2023-01-10 ENCOUNTER — OFFICE VISIT (OUTPATIENT)
Dept: FAMILY MEDICINE CLINIC | Facility: CLINIC | Age: 43
End: 2023-01-10

## 2023-01-10 ENCOUNTER — APPOINTMENT (OUTPATIENT)
Dept: LAB | Facility: MEDICAL CENTER | Age: 43
End: 2023-01-10

## 2023-01-10 VITALS
HEART RATE: 85 BPM | TEMPERATURE: 99.1 F | BODY MASS INDEX: 23.92 KG/M2 | WEIGHT: 130 LBS | OXYGEN SATURATION: 98 % | SYSTOLIC BLOOD PRESSURE: 120 MMHG | HEIGHT: 62 IN | DIASTOLIC BLOOD PRESSURE: 88 MMHG

## 2023-01-10 DIAGNOSIS — J02.9 SORE THROAT: Primary | ICD-10-CM

## 2023-01-10 DIAGNOSIS — R53.83 FATIGUE, UNSPECIFIED TYPE: ICD-10-CM

## 2023-01-10 DIAGNOSIS — R79.89 ABNORMAL CBC: ICD-10-CM

## 2023-01-10 DIAGNOSIS — E04.9 ENLARGED THYROID: ICD-10-CM

## 2023-01-10 DIAGNOSIS — E03.9 HYPOTHYROIDISM, UNSPECIFIED TYPE: ICD-10-CM

## 2023-01-10 DIAGNOSIS — J02.9 SORE THROAT: ICD-10-CM

## 2023-01-10 LAB
ALBUMIN SERPL BCP-MCNC: 3.5 G/DL (ref 3.5–5)
ALP SERPL-CCNC: 74 U/L (ref 46–116)
ALT SERPL W P-5'-P-CCNC: 54 U/L (ref 12–78)
ANION GAP SERPL CALCULATED.3IONS-SCNC: 5 MMOL/L (ref 4–13)
AST SERPL W P-5'-P-CCNC: 22 U/L (ref 5–45)
BASOPHILS # BLD AUTO: 0.05 THOUSANDS/ÂΜL (ref 0–0.1)
BASOPHILS NFR BLD AUTO: 1 % (ref 0–1)
BILIRUB SERPL-MCNC: 0.46 MG/DL (ref 0.2–1)
BUN SERPL-MCNC: 17 MG/DL (ref 5–25)
CALCIUM SERPL-MCNC: 8.6 MG/DL (ref 8.3–10.1)
CHLORIDE SERPL-SCNC: 105 MMOL/L (ref 96–108)
CO2 SERPL-SCNC: 26 MMOL/L (ref 21–32)
CREAT SERPL-MCNC: 0.93 MG/DL (ref 0.6–1.3)
EOSINOPHIL # BLD AUTO: 0.12 THOUSAND/ÂΜL (ref 0–0.61)
EOSINOPHIL NFR BLD AUTO: 1 % (ref 0–6)
ERYTHROCYTE [DISTWIDTH] IN BLOOD BY AUTOMATED COUNT: 12.2 % (ref 11.6–15.1)
GFR SERPL CREATININE-BSD FRML MDRD: 77 ML/MIN/1.73SQ M
GLUCOSE SERPL-MCNC: 105 MG/DL (ref 65–140)
HCT VFR BLD AUTO: 49.5 % (ref 34.8–46.1)
HGB BLD-MCNC: 16.8 G/DL (ref 11.5–15.4)
IMM GRANULOCYTES # BLD AUTO: 0.05 THOUSAND/UL (ref 0–0.2)
IMM GRANULOCYTES NFR BLD AUTO: 1 % (ref 0–2)
LYMPHOCYTES # BLD AUTO: 2.4 THOUSANDS/ÂΜL (ref 0.6–4.47)
LYMPHOCYTES NFR BLD AUTO: 23 % (ref 14–44)
MCH RBC QN AUTO: 31.4 PG (ref 26.8–34.3)
MCHC RBC AUTO-ENTMCNC: 33.9 G/DL (ref 31.4–37.4)
MCV RBC AUTO: 93 FL (ref 82–98)
MONOCYTES # BLD AUTO: 0.69 THOUSAND/ÂΜL (ref 0.17–1.22)
MONOCYTES NFR BLD AUTO: 7 % (ref 4–12)
NEUTROPHILS # BLD AUTO: 6.95 THOUSANDS/ÂΜL (ref 1.85–7.62)
NEUTS SEG NFR BLD AUTO: 67 % (ref 43–75)
NRBC BLD AUTO-RTO: 0 /100 WBCS
PLATELET # BLD AUTO: 244 THOUSANDS/UL (ref 149–390)
PMV BLD AUTO: 11.7 FL (ref 8.9–12.7)
POTASSIUM SERPL-SCNC: 4.3 MMOL/L (ref 3.5–5.3)
PROT SERPL-MCNC: 6.8 G/DL (ref 6.4–8.4)
RBC # BLD AUTO: 5.35 MILLION/UL (ref 3.81–5.12)
SODIUM SERPL-SCNC: 136 MMOL/L (ref 135–147)
T4 FREE SERPL-MCNC: 0.89 NG/DL (ref 0.76–1.46)
TSH SERPL DL<=0.05 MIU/L-ACNC: 1.43 UIU/ML (ref 0.45–4.5)
WBC # BLD AUTO: 10.26 THOUSAND/UL (ref 4.31–10.16)

## 2023-01-10 RX ORDER — PREDNISONE 20 MG/1
40 TABLET ORAL DAILY
COMMUNITY
Start: 2023-01-02 | End: 2023-01-10

## 2023-01-10 NOTE — ASSESSMENT & PLAN NOTE
Check throat culture; suspect residual inflammation for acute viral syndrome; advised on magic mouth wash; check labs; advised antihistamine to help with any sinus drainage

## 2023-01-10 NOTE — PROGRESS NOTES
Cbc  Assessment/Plan:     1  Sore throat  Assessment & Plan:  Check throat culture; suspect residual inflammation for acute viral syndrome; advised on magic mouth wash; check labs; advised antihistamine to help with any sinus drainage    Orders:  -     Throat culture; Future  -     CBC and differential; Future  -     Comprehensive metabolic panel; Future  -     EBV acute panel; Future  -     al mag oxide-diphenhydramine-lidocaine viscous (MAGIC MOUTHWASH) 1:1:1 suspension; Swish and spit 10 mL every 4 (four) hours as needed for mouth pain or discomfort  -     Throat culture    2  Fatigue, unspecified type  -     CBC and differential; Future  -     Comprehensive metabolic panel; Future  -     TSH, 3rd generation; Future  -     T4, free; Future    3  Hypothyroidism, unspecified type  -     TSH, 3rd generation; Future  -     T4, free; Future  -     US thyroid; Future; Expected date: 01/10/2023    4  Enlarged thyroid  Assessment & Plan:  Tender on exam and enlarged; check u/s and labs and f/u with results    Orders:  -     US thyroid; Future; Expected date: 01/10/2023        Subjective:      Patient ID: Zuleima Dacosta is a 38 y o  female  Patient is here for urgent care follow up for continued symptoms of sore throat, cough and ear pain  Pt initially seen and diagnosed with acute viral syndrome; COVID and flu were negative  She returned for worsening sore throat and swelling and diagnosed with acute viral tonsillitis  Patient was put on steroids for 5 days and did not seem to help  Throat pain seems to not have improved  Seems to have pain in ear as well  No cough  No known fevers  Sx started about 10 days ago and sx have stayed the same but swelling has improved some  Noticed some fullness in thyroid region which concerns her  She states she knows she has a thyroid cyst and got nervous symptoms were related to this  She states she also complains of a lot of fatigue         The following portions of the patient's history were reviewed and updated as appropriate: allergies, current medications, past family history, past medical history, past social history, past surgical history, and problem list     Review of Systems   Constitutional: Positive for fatigue  Negative for chills and fever  HENT: Positive for sore throat  Respiratory: Negative for cough, shortness of breath and wheezing  Musculoskeletal: Positive for neck pain  Objective:      /88 (BP Location: Right arm, Patient Position: Sitting, Cuff Size: Standard)   Pulse 85   Temp 99 1 °F (37 3 °C) (Tympanic)   Ht 5' 2" (1 575 m)   Wt 59 kg (130 lb)   LMP 12/10/2022   SpO2 98%   BMI 23 78 kg/m²          Physical Exam  Vitals reviewed  Constitutional:       General: She is not in acute distress  Appearance: Normal appearance  She is not ill-appearing, toxic-appearing or diaphoretic  HENT:      Head: Normocephalic and atraumatic  Right Ear: Tympanic membrane normal       Left Ear: Tympanic membrane normal       Nose: Nose normal       Right Sinus: No maxillary sinus tenderness or frontal sinus tenderness  Left Sinus: No maxillary sinus tenderness or frontal sinus tenderness  Mouth/Throat:      Pharynx: Posterior oropharyngeal erythema present  No oropharyngeal exudate  Tonsils: No tonsillar exudate or tonsillar abscesses  1+ on the right  1+ on the left  Eyes:      General: No scleral icterus  Right eye: No discharge  Left eye: No discharge  Conjunctiva/sclera: Conjunctivae normal    Neck:      Thyroid: Thyromegaly and thyroid tenderness present  Cardiovascular:      Rate and Rhythm: Normal rate and regular rhythm  Pulses: Normal pulses  Heart sounds: Normal heart sounds  No murmur heard  No gallop  Pulmonary:      Effort: Pulmonary effort is normal  No respiratory distress  Breath sounds: Normal breath sounds  No stridor  No wheezing, rhonchi or rales     Musculoskeletal: Right lower leg: No edema  Left lower leg: No edema  Lymphadenopathy:      Cervical: Cervical adenopathy present  Neurological:      General: No focal deficit present  Mental Status: She is alert and oriented to person, place, and time  Psychiatric:         Mood and Affect: Mood normal          Behavior: Behavior normal          Thought Content:  Thought content normal          Judgment: Judgment normal

## 2023-01-11 ENCOUNTER — TELEPHONE (OUTPATIENT)
Dept: ADMINISTRATIVE | Facility: OTHER | Age: 43
End: 2023-01-11

## 2023-01-11 NOTE — TELEPHONE ENCOUNTER
----- Message from Chris Bailey MA sent at 1/10/2023  2:46 PM EST -----  Regarding: care gap request  01/10/23 2:46 PM    Hello, our patient attached above has had Hepatitis C and Pap Smear (HPV) aka Cervical Cancer Screening completed/performed  Please assist in updating the patient chart by making an External outreach to  [facility], [facility] facility located in [place]  The date of service is 2022      Thank you,  Chris Bailey  [facility]

## 2023-01-11 NOTE — LETTER
Procedure Request Form: Cervical Cancer Screening and Hepatitis C      Date Requested: 23  Patient: Torie Rasmussen  Patient : 1980   Referring Provider: Ruth Tran, DO        Date of Procedure ______________________________       The above patient has informed us that they have completed their   most recent Cervical Cancer Screening and Hepatitis C at your facility  Please complete   this form and attach all corresponding procedure reports/results  Comments __________________________________________________________  ____________________________________________________________________  ____________________________________________________________________  ____________________________________________________________________    Facility Completing Procedure _________________________________________    Form Completed By (print name) _______________________________________      Signature __________________________________________________________      These reports are needed for  compliance  Please fax this completed form and a copy of the procedure report to our office located at [place] [address] as soon as possible to [phone] attention Naheed Briceño: Phone [phone]    We thank you for your assistance in treating our mutual patient

## 2023-01-12 ENCOUNTER — TELEPHONE (OUTPATIENT)
Dept: FAMILY MEDICINE CLINIC | Facility: CLINIC | Age: 43
End: 2023-01-12

## 2023-01-12 LAB
BACTERIA THROAT CULT: ABNORMAL
EBV NA IGG SER IA-ACNC: 96.4 U/ML (ref 0–17.9)
EBV VCA IGG SER IA-ACNC: 272 U/ML (ref 0–17.9)
EBV VCA IGM SER IA-ACNC: <36 U/ML (ref 0–35.9)
INTERPRETATION: ABNORMAL

## 2023-01-12 NOTE — TELEPHONE ENCOUNTER
Upon review of the In Basket request and the patient's chart, initial outreach has been made via fax to facility  Please see Contacts section for details       Thank you  Jose Elder

## 2023-01-12 NOTE — TELEPHONE ENCOUNTER
----- Message from Kisha Avila DO sent at 1/11/2023  5:16 PM EST -----  Please notify patient her labs were normal except her CBC showed some elevated WBC levels and hemoglobin  This may be from recent illness and steroids  Would recommend repeating CBC in 1 month  Complete ultrasound as ordered

## 2023-01-12 NOTE — TELEPHONE ENCOUNTER
Upon review of the In Basket request we were able to locate, review, and update the patient chart as requested for Pap Smear (HPV) aka Cervical Cancer Screening  Last pap from facility was 2017    Any additional questions or concerns should be emailed to the Practice Liaisons via the appropriate education email address, please do not reply via In Basket      Thank you  Soren Clifford

## 2023-01-12 NOTE — TELEPHONE ENCOUNTER
----- Message from Michalea Osman DO sent at 1/11/2023  5:16 PM EST -----  Please notify patient her labs were normal except her CBC showed some elevated WBC levels and hemoglobin  This may be from recent illness and steroids  Would recommend repeating CBC in 1 month  Complete ultrasound as ordered

## 2023-01-13 ENCOUNTER — TELEPHONE (OUTPATIENT)
Dept: FAMILY MEDICINE CLINIC | Facility: CLINIC | Age: 43
End: 2023-01-13

## 2023-01-13 RX ORDER — AMOXICILLIN 875 MG/1
875 TABLET, COATED ORAL 2 TIMES DAILY
Qty: 20 TABLET | Refills: 0 | Status: SHIPPED | OUTPATIENT
Start: 2023-01-13 | End: 2023-01-23

## 2023-01-13 NOTE — TELEPHONE ENCOUNTER
----- Message from Kisha Avila DO sent at 1/13/2023  1:04 PM EST -----  Called and left message for patient to call office  Throat culture positive for strep  10 days of amoxicillin called in for patient and sent to pharmacy  Please notify patient

## 2023-01-19 ENCOUNTER — HOSPITAL ENCOUNTER (OUTPATIENT)
Dept: ULTRASOUND IMAGING | Facility: MEDICAL CENTER | Age: 43
Discharge: HOME | End: 2023-01-19

## 2023-01-19 DIAGNOSIS — E04.9 ENLARGED THYROID: ICD-10-CM

## 2023-01-19 DIAGNOSIS — E03.9 HYPOTHYROIDISM, UNSPECIFIED TYPE: ICD-10-CM

## 2023-03-08 ENCOUNTER — TELEPHONE (OUTPATIENT)
Dept: FAMILY MEDICINE CLINIC | Facility: CLINIC | Age: 43
End: 2023-03-08

## 2023-03-08 NOTE — TELEPHONE ENCOUNTER
----- Message from Miguel Angel Coppola DO sent at 3/8/2023  1:56 PM EST -----  Pt has not viewed my foc[employer]us message regarding u/s results  Please notify her  Repeat u/s in 1 year

## 2023-03-20 ENCOUNTER — OFFICE VISIT (OUTPATIENT)
Dept: FAMILY MEDICINE CLINIC | Facility: CLINIC | Age: 43
End: 2023-03-20

## 2023-03-20 VITALS
WEIGHT: 132 LBS | OXYGEN SATURATION: 99 % | BODY MASS INDEX: 24.29 KG/M2 | TEMPERATURE: 97.7 F | SYSTOLIC BLOOD PRESSURE: 128 MMHG | DIASTOLIC BLOOD PRESSURE: 82 MMHG | HEART RATE: 86 BPM | HEIGHT: 62 IN | RESPIRATION RATE: 16 BRPM

## 2023-03-20 DIAGNOSIS — F41.9 ANXIETY: ICD-10-CM

## 2023-03-20 DIAGNOSIS — G47.00 INSOMNIA, UNSPECIFIED TYPE: ICD-10-CM

## 2023-03-20 DIAGNOSIS — E04.1 THYROID NODULE: ICD-10-CM

## 2023-03-20 DIAGNOSIS — E03.9 HYPOTHYROIDISM, UNSPECIFIED TYPE: ICD-10-CM

## 2023-03-20 DIAGNOSIS — Z13.1 SCREENING FOR DIABETES MELLITUS: ICD-10-CM

## 2023-03-20 DIAGNOSIS — Z11.59 NEED FOR HEPATITIS C SCREENING TEST: ICD-10-CM

## 2023-03-20 DIAGNOSIS — F10.11 HISTORY OF ALCOHOL ABUSE: Primary | ICD-10-CM

## 2023-03-20 DIAGNOSIS — Z13.220 SCREENING CHOLESTEROL LEVEL: ICD-10-CM

## 2023-03-20 DIAGNOSIS — Z13.0 SCREENING FOR DEFICIENCY ANEMIA: ICD-10-CM

## 2023-03-20 PROBLEM — E04.9 ENLARGED THYROID: Status: RESOLVED | Noted: 2023-01-10 | Resolved: 2023-03-20

## 2023-03-20 RX ORDER — LEVOTHYROXINE SODIUM 0.03 MG/1
25 TABLET ORAL DAILY
Qty: 90 TABLET | Refills: 1 | Status: SHIPPED | OUTPATIENT
Start: 2023-03-20

## 2023-03-20 NOTE — ASSESSMENT & PLAN NOTE
History of 2 small thyroid nodules  They have been stable  Most recent ultrasound January 19, 2023  Recommend repeat ultrasound in 1 year

## 2023-03-20 NOTE — PROGRESS NOTES
Name: Lila Aaron      : 1980      MRN: [id number]  Encounter Provider: Anmol Kim DO  Encounter Date: 3/20/2023   Encounter department: [address] [place] [address]     1  History of alcohol abuse  Assessment & Plan:  History of detox and rehab stay 2020  She has not had any alcohol since this time  Doing well on Antabuse 250 mg daily      2  Hypothyroidism, unspecified type  Assessment & Plan:  Doing well on levothyroxine 25 mcg daily  We will check labs prior to next appointment    Orders:  -     TSH, 3rd generation with Free T4 reflex; Future; Expected date: 2023  -     levothyroxine 25 mcg tablet; Take 1 tablet (25 mcg total) by mouth daily    3  Thyroid nodule  Assessment & Plan:  History of 2 small thyroid nodules  They have been stable  Most recent ultrasound 2023  Recommend repeat ultrasound in 1 year  4  Insomnia, unspecified type    5  Screening for deficiency anemia  -     CBC and differential; Future; Expected date: 2023    6  Screening for diabetes mellitus  -     Comprehensive metabolic panel; Future; Expected date: 2023    7  Screening cholesterol level  -     Lipid Panel with Direct LDL reflex; Future; Expected date: 2023    8  Need for hepatitis C screening test  -     Hepatitis C antibody; Future; Expected date: 2023    9  Anxiety  Assessment & Plan:  Doing well on sertraline 150  Patient is current with mammography  Patient current with age-appropriate vaccinations    6 months, PE, fasting blood work prior  Subjective     Patient presents for recheck of chronic medical problems today  She is doing well without complaints today  She has not had any alcohol in over 2 years  Doing well on sertraline and levothyroxine  Patient had ultrasound of thyroid done in     Review of Systems   Respiratory: Negative  Cardiovascular: Negative  Gastrointestinal: Negative  Genitourinary: Negative          Past Medical History:   Diagnosis Date   • Atypical lymphocytosis     last assessed: 4/28/2015   • Baker cyst     last assessed: 4/13/2015   • Chronic fatigue     last assessed: 2/3/2016   • Disease of thyroid gland    • Elevated LFTs     last assessed: 4/28/2015   • Female infertility    • Hypothyroidism     last assessed: 12/24/2015   • Night sweats     last assessed: 4/22/2015   • Shortness of breath     last assessed: 4/22/2015     Past Surgical History:   Procedure Laterality Date   • ANTERIOR CRUCIATE LIGAMENT REPAIR Right     primary repair of knee   • COLPOSCOPY     • DILATION AND CURETTAGE OF UTERUS     • KNEE ARTHROSCOPY W/ ACL RECONSTRUCTION      right knee   • OVARIAN CYST REMOVAL     • WISDOM TOOTH EXTRACTION       Family History   Problem Relation Age of Onset   • Thyroid disease unspecified Mother    • Diabetes Mother         mellitus   • Prostate cancer Father    • Thyroid disease unspecified Sister    • Diabetes type II Maternal Grandmother    • Diabetes unspecified Maternal Grandfather    • Diabetes unspecified Maternal Uncle    • Cancer Maternal Uncle      Social History     Socioeconomic History   • Marital status: /Civil Union     Spouse name: None   • Number of children: None   • Years of education: None   • Highest education level: None   Occupational History   • None   Tobacco Use   • Smoking status: Never   • Smokeless tobacco: Never   Vaping Use   • Vaping Use: Never used   Substance and Sexual Activity   • Alcohol use: No   • Drug use: No   • Sexual activity: Yes     Partners: Male   Other Topics Concern   • None   Social History Narrative   • None     Social Determinants of Health     Financial Resource Strain: Not on file   Food Insecurity: Not on file   Transportation Needs: Not on file   Physical Activity: Not on file   Stress: Not on file   Social Connections: Not on file   Intimate Partner Violence: Not on file   Housing Stability: Not on file Current Outpatient Medications on File Prior to Visit   Medication Sig   • disulfiram (ANTABUSE) 250 mg tablet TAKE 1 TABLET DAILY   • sertraline (ZOLOFT) 100 mg tablet take 1 1/2 tabs daily   • [DISCONTINUED] levothyroxine 25 mcg tablet Take 1 tablet (25 mcg total) by mouth daily   • [DISCONTINUED] al mag oxide-diphenhydramine-lidocaine viscous (MAGIC MOUTHWASH) 1:1:1 suspension Swish and spit 10 mL every 4 (four) hours as needed for mouth pain or discomfort     No Known Allergies  Immunization History   Administered Date(s) Administered   • COVID-19 MODERNA VACC 0 5 ML IM 01/20/2021, 02/18/2021, 11/11/2021   • COVID-19 Pfizer Vac BIVALENT Fadi-sucrose 12 Yr+ IM (BOOSTER ONLY) 10/24/2022   • Hep B, adult 03/29/2013   • INFLUENZA 09/01/2012, 09/25/2014, 12/24/2015, 09/01/2016, 09/12/2017, 10/24/2022   • Influenza Quadrivalent Preservative Free 3 years and older IM 09/25/2014   • Influenza Quadrivalent, 6-35 Months IM 12/24/2015, 09/01/2016, 09/01/2017   • Influenza, injectable, quadrivalent, preservative free 0 5 mL 10/09/2019, 10/29/2020   • Influenza, recombinant, quadrivalent,injectable, preservative free 09/24/2018   • Tdap 01/01/2011, 06/14/2013, 08/28/2017   • Tuberculin Skin Test-PPD Intradermal 03/22/2013       Objective     /82 (BP Location: Right arm, Patient Position: Sitting, Cuff Size: Adult)   Pulse 86   Temp 97 7 °F (36 5 °C) (Temporal)   Resp 16   Ht 5' 1 81" (1 57 m)   Wt 59 9 kg (132 lb)   SpO2 99%   BMI 24 29 kg/m²     Physical Exam  Cardiovascular:      Rate and Rhythm: Normal rate and regular rhythm  Heart sounds: Normal heart sounds  Comments: Carotids: no bruits  Ext: no edema  Pulmonary:      Effort: Pulmonary effort is normal  No respiratory distress  Breath sounds: No wheezing or rales  Psychiatric:         Behavior: Behavior normal          Thought Content:  Thought content normal        Matthias Shan, DO

## 2023-03-20 NOTE — ASSESSMENT & PLAN NOTE
History of detox and rehab stay November 2020  She has not had any alcohol since this time    Doing well on Antabuse 250 mg daily

## 2023-04-28 ENCOUNTER — PATIENT MESSAGE (OUTPATIENT)
Dept: FAMILY MEDICINE CLINIC | Facility: CLINIC | Age: 43
End: 2023-04-28

## 2023-04-28 DIAGNOSIS — E03.9 HYPOTHYROIDISM, UNSPECIFIED TYPE: ICD-10-CM

## 2023-04-28 RX ORDER — LEVOTHYROXINE SODIUM 0.03 MG/1
25 TABLET ORAL DAILY
Qty: 90 TABLET | Refills: 1 | Status: SHIPPED | OUTPATIENT
Start: 2023-04-28

## 2023-07-10 DIAGNOSIS — F41.1 GAD (GENERALIZED ANXIETY DISORDER): ICD-10-CM

## 2023-07-10 RX ORDER — SERTRALINE HYDROCHLORIDE 100 MG/1
TABLET, FILM COATED ORAL
Qty: 90 TABLET | Refills: 0 | Status: SHIPPED | OUTPATIENT
Start: 2023-07-10

## 2023-08-11 ENCOUNTER — VBI (OUTPATIENT)
Dept: ADMINISTRATIVE | Facility: OTHER | Age: 43
End: 2023-08-11

## 2023-09-06 DIAGNOSIS — F41.1 GAD (GENERALIZED ANXIETY DISORDER): ICD-10-CM

## 2023-09-06 NOTE — TELEPHONE ENCOUNTER
Received fax thru [facility] for med refill    Sertraline 100 mg tab  Take 1 daily  #90  [facility] [place]

## 2023-09-07 RX ORDER — SERTRALINE HYDROCHLORIDE 100 MG/1
TABLET, FILM COATED ORAL
Qty: 90 TABLET | Refills: 0 | Status: SHIPPED | OUTPATIENT
Start: 2023-09-07

## 2023-11-03 DIAGNOSIS — F41.1 GAD (GENERALIZED ANXIETY DISORDER): ICD-10-CM

## 2023-11-03 RX ORDER — SERTRALINE HYDROCHLORIDE 100 MG/1
TABLET, FILM COATED ORAL
Qty: 90 TABLET | Refills: 0 | Status: SHIPPED | OUTPATIENT
Start: 2023-11-03

## 2023-11-03 NOTE — TELEPHONE ENCOUNTER
LVM requesting pt call the office to schedule overdue f/u with PCP.  Pt also due for Annual Physical

## 2023-11-03 NOTE — TELEPHONE ENCOUNTER
Please call patient to schedule her follow up.   She is overdue for her 6 month follow up (was due in Sept)    Last OV 3/20/23    No future appts

## 2023-11-27 ENCOUNTER — OFFICE VISIT (OUTPATIENT)
Dept: FAMILY MEDICINE CLINIC | Facility: CLINIC | Age: 43
End: 2023-11-27
Payer: COMMERCIAL

## 2023-11-27 VITALS
BODY MASS INDEX: 22.61 KG/M2 | DIASTOLIC BLOOD PRESSURE: 80 MMHG | SYSTOLIC BLOOD PRESSURE: 122 MMHG | HEIGHT: 64 IN | OXYGEN SATURATION: 98 % | WEIGHT: 132.4 LBS | HEART RATE: 57 BPM | TEMPERATURE: 97.7 F

## 2023-11-27 DIAGNOSIS — E03.9 HYPOTHYROIDISM, UNSPECIFIED TYPE: ICD-10-CM

## 2023-11-27 DIAGNOSIS — Z00.00 ANNUAL PHYSICAL EXAM: Primary | ICD-10-CM

## 2023-11-27 DIAGNOSIS — R32 URINARY INCONTINENCE, UNSPECIFIED TYPE: ICD-10-CM

## 2023-11-27 DIAGNOSIS — F10.11 HISTORY OF ALCOHOL ABUSE: ICD-10-CM

## 2023-11-27 DIAGNOSIS — Z12.31 ENCOUNTER FOR SCREENING MAMMOGRAM FOR MALIGNANT NEOPLASM OF BREAST: ICD-10-CM

## 2023-11-27 DIAGNOSIS — F41.1 GAD (GENERALIZED ANXIETY DISORDER): ICD-10-CM

## 2023-11-27 DIAGNOSIS — Z23 ENCOUNTER FOR IMMUNIZATION: ICD-10-CM

## 2023-11-27 DIAGNOSIS — E04.1 THYROID NODULE: ICD-10-CM

## 2023-11-27 PROCEDURE — 90471 IMMUNIZATION ADMIN: CPT

## 2023-11-27 PROCEDURE — 99396 PREV VISIT EST AGE 40-64: CPT | Performed by: NURSE PRACTITIONER

## 2023-11-27 PROCEDURE — 90686 IIV4 VACC NO PRSV 0.5 ML IM: CPT

## 2023-11-27 RX ORDER — LEVOTHYROXINE SODIUM 0.03 MG/1
25 TABLET ORAL DAILY
Qty: 90 TABLET | Refills: 1 | Status: SHIPPED | OUTPATIENT
Start: 2023-11-27 | End: 2023-12-01 | Stop reason: SDUPTHER

## 2023-11-27 RX ORDER — SERTRALINE HYDROCHLORIDE 100 MG/1
TABLET, FILM COATED ORAL
Qty: 90 TABLET | Refills: 1 | Status: SHIPPED | OUTPATIENT
Start: 2023-11-27 | End: 2023-12-01 | Stop reason: SDUPTHER

## 2023-11-27 RX ORDER — ALBUTEROL SULFATE 90 UG/1
2 AEROSOL, METERED RESPIRATORY (INHALATION) EVERY 6 HOURS PRN
COMMUNITY
Start: 2023-10-30

## 2023-11-27 RX ORDER — DISULFIRAM 250 MG/1
250 TABLET ORAL DAILY
Qty: 90 TABLET | Refills: 1 | Status: SHIPPED | OUTPATIENT
Start: 2023-11-27 | End: 2023-12-01 | Stop reason: SDUPTHER

## 2023-11-27 NOTE — PATIENT INSTRUCTIONS
Wellness Visit for Adults   AMBULATORY CARE:   A wellness visit  is when you see your healthcare provider to get screened for health problems. Your healthcare provider will also give you advice on how to stay healthy. Write down your questions so you remember to ask them. Ask your healthcare provider how often you should have a wellness visit. What happens at a wellness visit:  Your healthcare provider will ask about your health, and your family history of health problems. This includes high blood pressure, heart disease, and cancer. He or she will ask if you have symptoms that concern you, if you smoke, and about your mood. You may also be asked about your intake of medicines, supplements, food, and alcohol. Any of the following may be done: Your weight  will be checked. Your height may also be checked so your body mass index (BMI) can be calculated. Your BMI shows if you are at a healthy weight. Your blood pressure  and heart rate will be checked. Your temperature may also be checked. Blood and urine tests  may be done. Blood tests may be done to check your cholesterol levels. Abnormal cholesterol levels increase your risk for heart disease and stroke. You may also need a blood or urine test to check for diabetes if you are at increased risk. Urine tests may be done to look for signs of an infection or kidney disease. A physical exam  includes checking your heartbeat and lungs with a stethoscope. Your healthcare provider may also check your skin to look for sun damage. Screening tests  may be recommended. A screening test is done to check for diseases that may not cause symptoms. The screening tests you may need depend on your age, gender, family history, and lifestyle habits. For example, colorectal screening may be recommended if you are 48years old or older. Screening tests you need if you are a woman:   A Pap smear  is used to screen for cervical cancer.  Pap smears are usually done every 3 to 5 years depending on your age. You may need them more often if you have had abnormal Pap smear test results in the past. Ask your healthcare provider how often you should have a Pap smear. A mammogram  is an x-ray of your breasts to screen for breast cancer. Experts recommend mammograms every 2 years starting at age 48 years. You may need a mammogram at age 52 years or younger if you have an increased risk for breast cancer. Talk to your healthcare provider about when you should start having mammograms and how often you need them. Vaccines you may need:   Get an influenza vaccine  every year. The influenza vaccine protects you from the flu. Several types of viruses cause the flu. The viruses change over time, so new vaccines are made each year. Get a tetanus-diphtheria (Td) booster vaccine  every 10 years. This vaccine protects you against tetanus and diphtheria. Tetanus is a severe infection that may cause painful muscle spasms and lockjaw. Diphtheria is a severe bacterial infection that causes a thick covering in the back of your mouth and throat. Get a human papillomavirus (HPV) vaccine  if you are female and aged 23 to 32 or male 23 to 24 and never received it. This vaccine protects you from HPV infection. HPV is the most common infection spread by sexual contact. HPV may also cause vaginal, penile, and anal cancers. Get a pneumococcal vaccine  if you are aged 72 years or older. The pneumococcal vaccine is an injection given to protect you from pneumococcal disease. Pneumococcal disease is an infection caused by pneumococcal bacteria. The infection may cause pneumonia, meningitis, or an ear infection. Get a shingles vaccine  if you are 60 or older, even if you have had shingles before. The shingles vaccine is an injection to protect you from the varicella-zoster virus. This is the same virus that causes chickenpox.  Shingles is a painful rash that develops in people who had chickenpox or have been exposed to the virus. How to eat healthy:  My Plate is a model for planning healthy meals. It shows the types and amounts of foods that should go on your plate. Fruits and vegetables make up about half of your plate, and grains and protein make up the other half. A serving of dairy is included on the side of your plate. The amount of calories and serving sizes you need depends on your age, gender, weight, and height. Examples of healthy foods are listed below:  Eat a variety of vegetables  such as dark green, red, and orange vegetables. You can also include canned vegetables low in sodium (salt) and frozen vegetables without added butter or sauces. Eat a variety of fresh fruits , canned fruit in 100% juice, frozen fruit, and dried fruit. Include whole grains. At least half of the grains you eat should be whole grains. Examples include whole-wheat bread, wheat pasta, brown rice, and whole-grain cereals such as oatmeal.    Eat a variety of protein foods such as seafood (fish and shellfish), lean meat, and poultry without skin (turkey and chicken). Examples of lean meats include pork leg, shoulder, or tenderloin, and beef round, sirloin, tenderloin, and extra lean ground beef. Other protein foods include eggs and egg substitutes, beans, peas, soy products, nuts, and seeds. Choose low-fat dairy products such as skim or 1% milk or low-fat yogurt, cheese, and cottage cheese. Limit unhealthy fats  such as butter, hard margarine, and shortening. Exercise:  Exercise at least 30 minutes per day on most days of the week. Some examples of exercise include walking, biking, dancing, and swimming. You can also fit in more physical activity by taking the stairs instead of the elevator or parking farther away from stores. Include muscle strengthening activities 2 days each week. Regular exercise provides many health benefits.  It helps you manage your weight, and decreases your risk for type 2 diabetes, heart disease, stroke, and high blood pressure. Exercise can also help improve your mood. Ask your healthcare provider about the best exercise plan for you. General health and safety guidelines:   Do not smoke. Nicotine and other chemicals in cigarettes and cigars can cause lung damage. Ask your healthcare provider for information if you currently smoke and need help to quit. E-cigarettes or smokeless tobacco still contain nicotine. Talk to your healthcare provider before you use these products. Limit alcohol. A drink of alcohol is 12 ounces of beer, 5 ounces of wine, or 1½ ounces of liquor. Lose weight, if needed. Being overweight increases your risk of certain health conditions. These include heart disease, high blood pressure, type 2 diabetes, and certain types of cancer. Protect your skin. Do not sunbathe or use tanning beds. Use sunscreen with a SPF 15 or higher. Apply sunscreen at least 15 minutes before you go outside. Reapply sunscreen every 2 hours. Wear protective clothing, hats, and sunglasses when you are outside. Drive safely. Always wear your seatbelt. Make sure everyone in your car wears a seatbelt. A seatbelt can save your life if you are in an accident. Do not use your cell phone when you are driving. This could distract you and cause an accident. Pull over if you need to make a call or send a text message. Practice safe sex. Use latex condoms if are sexually active and have more than one partner. Your healthcare provider may recommend screening tests for sexually transmitted infections (STIs). Wear helmets, lifejackets, and protective gear. Always wear a helmet when you ride a bike or motorcycle, go skiing, or play sports that could cause a head injury. Wear protective equipment when you play sports. Wear a lifejacket when you are on a boat or doing water sports.     © Copyright [employer] 2023 Information is for End User's use only and may not be sold, redistributed or otherwise used for commercial purposes. The above information is an  only. It is not intended as medical advice for individual conditions or treatments. Talk to your doctor, nurse or pharmacist before following any medical regimen to see if it is safe and effective for you.

## 2023-11-27 NOTE — ASSESSMENT & PLAN NOTE
Reports doing well on levothyroxine 25  Due for repeat thyroid ultrasound (nodule) in January   Order already in  [id number] TSH   Order already in  Rx refilled today for 90 and 1 refill

## 2023-11-27 NOTE — ASSESSMENT & PLAN NOTE
Reports sober since 2020  No urges or cravings  Continues with Antabuse daily  Tolerating well-no side effect  Rx refilled today for 90+1 refill

## 2023-11-27 NOTE — PROGRESS NOTES
ADULT ANNUAL [facility]    NAME: Sindi Rojas  AGE: 41 y.o.  SEX: female  : 1980     DATE: 2023     Assessment and Plan:   Comprehensive physical exam  PMH and chronic conditions reviewed  Medications reconciled  Preventative care and recommended screenings as below   Fasting lab work orders in the system already   Patient will complete in the next few weeks  Follow-up 6 months-routine check  Annual physicals  Follow-up sooner as needed  Problem List Items Addressed This Visit          Endocrine    Thyroid nodule     Repeat thyroid scan due in 2024         Relevant Medications    levothyroxine 25 mcg tablet    Hypothyroidism     Reports doing well on levothyroxine 25  Due for repeat thyroid ultrasound (nodule) in January   Order already in  Recheck TSH   Order already in  Rx refilled today for 90 and 1 refill         Relevant Medications    levothyroxine 25 mcg tablet       Other    MARIANO (generalized anxiety disorder)    Relevant Medications    sertraline (ZOLOFT) 100 mg tablet    disulfiram (ANTABUSE) 250 mg tablet    History of alcohol abuse     Reports sober since   No urges or cravings  Continues with Antabuse daily  Tolerating well-no side effect  Rx refilled today for 90+1 refill         Relevant Medications    disulfiram (ANTABUSE) 250 mg tablet    Urinary incontinence     Referral today to reestablish with uro-GYN         Relevant Orders    Ambulatory Referral to Urogynecology     Other Visit Diagnoses       Annual physical exam    -  Primary    Encounter for immunization        Relevant Orders    influenza vaccine, quadrivalent, 0.5 mL, preservative-free, for adult and pediatric patients 6 mos+ ([facility], [facility], [facility], [address])    Encounter for screening mammogram for malignant neoplasm of breast        Relevant Orders    Mammo screening bilateral w 3d & cad            Immunizations and preventive care screenings were discussed with patient today. Appropriate education was printed on patient's after visit summary. Counseling:  Alcohol/drug use: discussed moderation in alcohol intake, the recommendations for healthy alcohol use, and avoidance of illicit drug use. Dental Health: discussed importance of regular tooth brushing, flossing, and dental visits. Injury prevention: discussed safety/seat belts, safety helmets, smoke detectors, carbon dioxide detectors, and smoking near bedding or upholstery. Sexual health: discussed sexually transmitted diseases, partner selection, use of condoms, avoidance of unintended pregnancy, and contraceptive alternatives. Exercise: the importance of regular exercise/physical activity was discussed. Recommend exercise 3-5 times per week for at least 30 minutes. BMI Counseling: Body mass index is 23.09 kg/m². The BMI is above normal. Nutrition recommendations include moderation in carbohydrate intake and reducing intake of saturated and trans fat. Exercise recommendations include moderate physical activity 150 minutes/week. Rationale for BMI follow-up plan is due to patient being overweight or obese. Healthy lifestyle counseling  Patient eats well   CrossFit several times per week      Depression Screening and Follow-up Plan: Clincally patient does not have depression. No treatment is required. Patient doing well on sertraline 150 daily  Treating anxiety -reports well-managed. No anxiety/panic attacks  Denies depression. No SI/HI        Return in about 6 months (around 5/27/2024) for Recheck. Chief Complaint:     Chief Complaint   Patient presents with    Physical Exam      History of Present Illness:     Adult Annual Physical   Patient here for a comprehensive physical exam. The patient reports no problems. Diet and Physical Activity  Diet/Nutrition: well balanced diet and limited junk food. Exercise: strength training exercises and CrossFit .       Depression Screening  PHQ-2/9 Depression Screening    Little interest or pleasure in doing things: 0 - not at all  Feeling down, depressed, or hopeless: 0 - not at all  Trouble falling or staying asleep, or sleeping too much: 0 - not at all  Feeling tired or having little energy: 0 - not at all  Poor appetite or overeatin - not at all  Feeling bad about yourself - or that you are a failure or have let yourself or your family down: 0 - not at all  Trouble concentrating on things, such as reading the newspaper or watching television: 0 - not at all  Moving or speaking so slowly that other people could have noticed. Or the opposite - being so fidgety or restless that you have been moving around a lot more than usual: 0 - not at all  Thoughts that you would be better off dead, or of hurting yourself in some way: 0 - not at all  PHQ-9 Score: 0   PHQ-9 Interpretation: No or Minimal depression          General Health  Sleep: sleeps well. Hearing: normal - bilateral.  Vision: no vision problems and goes for regular eye exams. Dental: regular dental visits. Immunizations: Tdap 2017; influenza administered today    /GYN Health  Follows with gynecology? yes ([facility])  Overdue for  [employer] scheduling  Patient is: premenopausal  Last menstrual period:   Contraceptive method: male partner had vasectomy. Due for mammogram-order in  Encourage monthly self breast exams  Encourage daily vitamin supplementation    Advanced Care Planning  Do you have an advanced directive? no  Do you have a durable medical power of ? no     Review of Systems:     Review of Systems   Constitutional: Negative. HENT: Negative. Respiratory: Negative. Cardiovascular: Negative. Gastrointestinal: Negative. Genitourinary: Negative.          Incontinence  Worsening during CrossFit  Reports she did see a specialist prior to [facility]  They discussed a sling at that time, but then with COVID she never followed back Musculoskeletal: Negative. Skin: Negative. Neurological: Negative. Psychiatric/Behavioral: Negative.         Past Medical History:     Past Medical History:   Diagnosis Date    Atypical lymphocytosis     last assessed: 4/28/2015    Baker cyst     last assessed: 4/13/2015    Chronic fatigue     last assessed: 2/3/2016    Disease of thyroid gland     Elevated LFTs     last assessed: 4/28/2015    Female infertility     Hypothyroidism     last assessed: 12/24/2015    Night sweats     last assessed: 4/22/2015    Shortness of breath     last assessed: 4/22/2015      Past Surgical History:     Past Surgical History:   Procedure Laterality Date    ANTERIOR CRUCIATE LIGAMENT REPAIR Right     primary repair of knee    COLPOSCOPY      DILATION AND CURETTAGE OF UTERUS      KNEE ARTHROSCOPY W/ ACL RECONSTRUCTION      right knee    OVARIAN CYST REMOVAL      WISDOM TOOTH EXTRACTION        Social History:     Social History     Socioeconomic History    Marital status: /Civil Union     Spouse name: None    Number of children: None    Years of education: None    Highest education level: None   Occupational History    None   Tobacco Use    Smoking status: Never    Smokeless tobacco: Never   Vaping Use    Vaping Use: Never used   Substance and Sexual Activity    Alcohol use: No    Drug use: No    Sexual activity: Yes     Partners: Male   Other Topics Concern    None   Social History Narrative    None     Social Determinants of Health     Financial Resource Strain: Not on file   Food Insecurity: Not on file   Transportation Needs: Not on file   Physical Activity: Not on file   Stress: Not on file   Social Connections: Not on file   Intimate Partner Violence: Not on file   Housing Stability: Not on file      Family History:     Family History   Problem Relation Age of Onset    Thyroid disease unspecified Mother     Diabetes Mother         mellitus    Prostate cancer Father     Thyroid disease unspecified Sister Diabetes type II Maternal Grandmother     Diabetes unspecified Maternal Grandfather     Diabetes unspecified Maternal Uncle     Cancer Maternal Uncle       Current Medications:     Current Outpatient Medications   Medication Sig Dispense Refill    albuterol (PROVENTIL HFA,VENTOLIN HFA) 90 mcg/act inhaler Inhale 2 puffs every 6 (six) hours as needed      disulfiram (ANTABUSE) 250 mg tablet Take 1 tablet (250 mg total) by mouth daily 90 tablet 1    levothyroxine 25 mcg tablet Take 1 tablet (25 mcg total) by mouth daily 90 tablet 1    sertraline (ZOLOFT) 100 mg tablet take 1 1/2 tabs daily 90 tablet 1     No current facility-administered medications for this visit. Allergies:     No Known Allergies   Physical Exam:     /80 (BP Location: Left arm, Patient Position: Sitting, Cuff Size: Adult)   Pulse 57   Temp 97.7 °F (36.5 °C)   Ht 5' 3.5" (1.613 m)   Wt 60.1 kg (132 lb 6.4 oz)   LMP 11/13/2023 (Approximate)   SpO2 98%   BMI 23.09 kg/m²     Physical Exam  Vitals and nursing note reviewed. Constitutional:       General: She is not in acute distress. Appearance: Normal appearance. She is well-developed and well-groomed. She is not ill-appearing. HENT:      Head: Normocephalic. Right Ear: Tympanic membrane, ear canal and external ear normal.      Left Ear: Tympanic membrane, ear canal and external ear normal.      Nose: Nose normal.      Mouth/Throat:      Mouth: Mucous membranes are moist.      Pharynx: Oropharynx is clear. Eyes:      Conjunctiva/sclera: Conjunctivae normal.      Pupils: Pupils are equal, round, and reactive to light. Neck:      Thyroid: No thyromegaly. Vascular: No carotid bruit. Cardiovascular:      Rate and Rhythm: Normal rate and regular rhythm. Pulses:           Posterior tibial pulses are 2+ on the right side and 2+ on the left side. Heart sounds: Normal heart sounds. Pulmonary:      Effort: Pulmonary effort is normal. No respiratory distress. Breath sounds: Normal breath sounds and air entry. Abdominal:      General: Bowel sounds are normal.      Palpations: Abdomen is soft. Tenderness: There is no abdominal tenderness. Musculoskeletal:      Right lower leg: No edema. Left lower leg: No edema. Lymphadenopathy:      Cervical: No cervical adenopathy. Skin:     General: Skin is warm and dry. Neurological:      General: No focal deficit present. Mental Status: She is alert and oriented to person, place, and time. Psychiatric:         Attention and Perception: Attention normal.         Mood and Affect: Mood normal.         Behavior: Behavior normal.         Thought Content:  Thought content normal.         Judgment: Judgment normal.          RENNY Jenkins  [facility] [address] [facility]

## 2023-12-01 DIAGNOSIS — F41.1 GAD (GENERALIZED ANXIETY DISORDER): ICD-10-CM

## 2023-12-01 DIAGNOSIS — F10.11 HISTORY OF ALCOHOL ABUSE: ICD-10-CM

## 2023-12-01 DIAGNOSIS — E03.9 HYPOTHYROIDISM, UNSPECIFIED TYPE: ICD-10-CM

## 2023-12-01 RX ORDER — SERTRALINE HYDROCHLORIDE 100 MG/1
TABLET, FILM COATED ORAL
Qty: 135 TABLET | Refills: 1 | Status: SHIPPED | OUTPATIENT
Start: 2023-12-01

## 2023-12-01 RX ORDER — LEVOTHYROXINE SODIUM 0.03 MG/1
25 TABLET ORAL DAILY
Qty: 90 TABLET | Refills: 1 | Status: SHIPPED | OUTPATIENT
Start: 2023-12-01

## 2023-12-01 RX ORDER — DISULFIRAM 250 MG/1
250 TABLET ORAL DAILY
Qty: 90 TABLET | Refills: 1 | Status: SHIPPED | OUTPATIENT
Start: 2023-12-01

## 2023-12-18 ENCOUNTER — HOSPITAL ENCOUNTER (OUTPATIENT)
Dept: MAMMOGRAPHY | Facility: MEDICAL CENTER | Age: 43
Discharge: HOME | End: 2023-12-18
Payer: COMMERCIAL

## 2023-12-18 VITALS — BODY MASS INDEX: 23.39 KG/M2 | WEIGHT: 132 LBS | HEIGHT: 63 IN

## 2023-12-18 DIAGNOSIS — Z12.31 ENCOUNTER FOR SCREENING MAMMOGRAM FOR MALIGNANT NEOPLASM OF BREAST: ICD-10-CM

## 2023-12-18 PROCEDURE — 77067 SCR MAMMO BI INCL CAD: CPT

## 2023-12-18 PROCEDURE — 77063 BREAST TOMOSYNTHESIS BI: CPT

## 2023-12-22 ENCOUNTER — HOSPITAL ENCOUNTER (OUTPATIENT)
Dept: ULTRASOUND IMAGING | Facility: MEDICAL CENTER | Age: 43
Discharge: HOME | End: 2023-12-22
Payer: COMMERCIAL

## 2023-12-22 DIAGNOSIS — E04.1 THYROID NODULE: ICD-10-CM

## 2023-12-22 PROCEDURE — 76536 US EXAM OF HEAD AND NECK: CPT

## 2024-05-29 DIAGNOSIS — F10.11 HISTORY OF ALCOHOL ABUSE: ICD-10-CM

## 2024-05-30 RX ORDER — DISULFIRAM 250 MG/1
250 TABLET ORAL DAILY
Qty: 90 TABLET | Refills: 0 | Status: SHIPPED | OUTPATIENT
Start: 2024-05-30

## 2024-05-30 NOTE — TELEPHONE ENCOUNTER
Please notify patient that her prescription was refilled today, but she is due for med check appointment in near future.  Please schedule

## 2024-06-25 DIAGNOSIS — E03.9 HYPOTHYROIDISM, UNSPECIFIED TYPE: ICD-10-CM

## 2024-06-25 RX ORDER — LEVOTHYROXINE SODIUM 0.03 MG/1
25 TABLET ORAL DAILY
Qty: 90 TABLET | Refills: 1 | Status: SHIPPED | OUTPATIENT
Start: 2024-06-25

## 2024-06-25 NOTE — TELEPHONE ENCOUNTER
Medication: Levothyroxine    Dose/Frequency: 25mcg    Quantity: 90    [facility]    Office:   [x] PCP/Provider -   [] Speciality/Provider -     Does the patient have enough for 3 days?   [x] Yes   [] No - Send as HP to POD

## 2024-07-08 ENCOUNTER — OFFICE VISIT (OUTPATIENT)
Dept: FAMILY MEDICINE CLINIC | Facility: CLINIC | Age: 44
End: 2024-07-08
Payer: COMMERCIAL

## 2024-07-08 VITALS
TEMPERATURE: 98.5 F | SYSTOLIC BLOOD PRESSURE: 114 MMHG | HEIGHT: 63 IN | HEART RATE: 62 BPM | OXYGEN SATURATION: 98 % | WEIGHT: 131 LBS | DIASTOLIC BLOOD PRESSURE: 80 MMHG | BODY MASS INDEX: 23.21 KG/M2

## 2024-07-08 DIAGNOSIS — F41.1 GAD (GENERALIZED ANXIETY DISORDER): ICD-10-CM

## 2024-07-08 DIAGNOSIS — G25.81 RESTLESS LEGS SYNDROME: Primary | ICD-10-CM

## 2024-07-08 DIAGNOSIS — N39.3 STRESS INCONTINENCE: ICD-10-CM

## 2024-07-08 DIAGNOSIS — Z13.220 SCREENING FOR LIPID DISORDERS: ICD-10-CM

## 2024-07-08 DIAGNOSIS — F10.11 HISTORY OF ALCOHOL ABUSE: ICD-10-CM

## 2024-07-08 DIAGNOSIS — Z13.0 SCREENING FOR DEFICIENCY ANEMIA: ICD-10-CM

## 2024-07-08 DIAGNOSIS — Z13.1 SCREENING FOR DIABETES MELLITUS: ICD-10-CM

## 2024-07-08 DIAGNOSIS — E04.1 THYROID NODULE: ICD-10-CM

## 2024-07-08 DIAGNOSIS — Z11.59 NEED FOR HEPATITIS C SCREENING TEST: ICD-10-CM

## 2024-07-08 DIAGNOSIS — E03.9 HYPOTHYROIDISM, UNSPECIFIED TYPE: ICD-10-CM

## 2024-07-08 PROCEDURE — 99214 OFFICE O/P EST MOD 30 MIN: CPT

## 2024-07-08 RX ORDER — SERTRALINE HYDROCHLORIDE 100 MG/1
TABLET, FILM COATED ORAL
Qty: 135 TABLET | Refills: 1 | Status: SHIPPED | OUTPATIENT
Start: 2024-07-08

## 2024-07-08 NOTE — ASSESSMENT & PLAN NOTE
Will send for TSH recheck.  Continue current levothyroxine dose.  Due for repeat thyroid ultrasound in January 2024.

## 2024-07-08 NOTE — ASSESSMENT & PLAN NOTE
Patient continues to be sober.  Takes Antabuse daily and is doing well with medication.  No urges or cravings.  Tolerating medication well with no side effects.

## 2024-07-08 NOTE — PROGRESS NOTES
Ambulatory Visit  Name: Eneida Umanzor      : 1980      MRN: [id number]  Encounter Provider: Kezia Fernandez PA-C  Encounter Date: 2024   Encounter department: [facility]    Assessment & Plan   1. Restless legs syndrome  Assessment & Plan:  Patient with symptoms of having to continuously move her legs at night. This has been a recurrent problem for several years.  She has tried several medications in the past except for Requip.  Will check labs first to evaluate for any lab abnormalities that may be causing restless legs syndrome. If no significant findings, trial of Requip for symptom management.    Orders:  -     Magnesium; Future; Expected date: 2024  -     TIBC Panel (incl. Iron, TIBC, % Iron Saturation); Future; Expected date: 2024  -     Ferritin; Future; Expected date: 2024  -     Magnesium  -     TIBC Panel (incl. Iron, TIBC, % Iron Saturation)  -     Ferritin  2. MARIANO (generalized anxiety disorder)  Assessment & Plan:  Anxiety stable with Zoloft 100 mg daily.  Orders:  -     sertraline (ZOLOFT) 100 mg tablet; take 1 1/2 tabs daily  3. Stress incontinence  Assessment & Plan:  Patient requesting additional referral to urogynecology.  She previously was recommended to have surgery to treat her urinary symptoms however was lost to follow-up.  Would like another referral today, provided for patient.  Orders:  -     Ambulatory Referral to Urogynecology; Future  4. History of alcohol abuse  Assessment & Plan:  Patient continues to be sober.  Takes Antabuse daily and is doing well with medication.  No urges or cravings.  Tolerating medication well with no side effects.   5. Hypothyroidism, unspecified type  Assessment & Plan:  Will send for TSH recheck.  Continue current levothyroxine dose.  Due for repeat thyroid ultrasound in 2024.  Orders:  -     TSH, 3rd generation with Free T4 reflex; Future; Expected date: 2024  -     T4, free; Future;  Expected date: 07/08/2024  -     TSH, 3rd generation with Free T4 reflex  -     T4, free  6. Screening for deficiency anemia  -     CBC and differential; Future; Expected date: 07/08/2024  -     CBC and differential  7. Thyroid nodule  -     TSH, 3rd generation with Free T4 reflex; Future; Expected date: 07/08/2024  -     T4, free; Future; Expected date: 07/08/2024  -     TSH, 3rd generation with Free T4 reflex  -     T4, free  8. Screening for diabetes mellitus  -     Comprehensive metabolic panel; Future; Expected date: 07/08/2024  -     Comprehensive metabolic panel  9. Screening for lipid disorders  -     Lipid Panel with Direct LDL reflex; Future; Expected date: 07/08/2024  -     Lipid Panel with Direct LDL reflex  10. Need for hepatitis C screening test  -     Hepatitis C Antibody; Future  -     Hepatitis C Antibody     Requesting complete lab panel, provided for patient. Agreeable for Hep C screening.   History of Present Illness     Patient presents today for a med check appointment.  She has been doing well since last check.  She is concerned today about restless leg syndrome which she has been dealing with on and off for several years.  She has tried several medications for it in the past which did not help long-term.  She continues to be sober with use of Antabuse.  She would like a referral to urogynecology for the incontinence she experiences, which is typically worse with CrossFit exercise.        Review of Systems   Constitutional:  Negative for chills, fatigue and fever.   Respiratory:  Negative for cough, chest tightness and shortness of breath.    Cardiovascular:  Negative for chest pain, palpitations and leg swelling.   Genitourinary:         Incontinence   Musculoskeletal:         Restless legs   Neurological:  Negative for dizziness, light-headedness and headaches.   Psychiatric/Behavioral:  Positive for sleep disturbance.        Objective     /80 (BP Location: Left arm, Patient Position:  "Sitting, Cuff Size: Standard)   Pulse 62   Temp 98.5 °F (36.9 °C)   Ht 5' 2.99\" (1.6 m)   Wt 59.4 kg (131 lb)   LMP 06/24/2024   SpO2 98%   BMI 23.21 kg/m²     Physical Exam  Vitals and nursing note reviewed.   Constitutional:       General: She is not in acute distress.     Appearance: Normal appearance. She is normal weight. She is not ill-appearing.   HENT:      Head: Normocephalic and atraumatic.   Cardiovascular:      Rate and Rhythm: Normal rate and regular rhythm.   Pulmonary:      Effort: Pulmonary effort is normal. No respiratory distress.      Breath sounds: Normal breath sounds.   Musculoskeletal:      Right lower leg: No edema.      Left lower leg: No edema.   Skin:     General: Skin is warm and dry.      Coloration: Skin is not cyanotic or pale.   Neurological:      General: No focal deficit present.      Mental Status: She is alert and oriented to person, place, and time. Mental status is at baseline.   Psychiatric:         Mood and Affect: Mood normal.         Behavior: Behavior normal.         Judgment: Judgment normal.       Administrative Statements     Kezia Fernandez PA-C  [facility]  "

## 2024-07-08 NOTE — ASSESSMENT & PLAN NOTE
Patient with symptoms of having to continuously move her legs at night. This has been a recurrent problem for several years.  She has tried several medications in the past except for Requip.  Will check labs first to evaluate for any lab abnormalities that may be causing restless legs syndrome. If no significant findings, trial of Requip for symptom management.

## 2024-07-08 NOTE — ASSESSMENT & PLAN NOTE
Patient requesting additional referral to urogynecology.  She previously was recommended to have surgery to treat her urinary symptoms however was lost to follow-up.  Would like another referral today, provided for patient.

## 2024-07-09 LAB
ALBUMIN SERPL-MCNC: 3.9 G/DL (ref 3.5–5.7)
ALP SERPL-CCNC: 44 U/L (ref 35–120)
ALT SERPL-CCNC: 37 U/L
ANION GAP SERPL CALCULATED.3IONS-SCNC: 10 MMOL/L (ref 3–11)
AST SERPL-CCNC: 36 U/L
BASOPHILS # BLD AUTO: 0.1 THOU/CMM (ref 0–0.1)
BASOPHILS NFR BLD AUTO: 1 %
BILIRUB SERPL-MCNC: 0.6 MG/DL (ref 0.2–1)
BUN SERPL-MCNC: 22 MG/DL (ref 7–25)
CALCIUM SERPL-MCNC: 8.9 MG/DL (ref 8.5–10.1)
CHLORIDE SERPL-SCNC: 103 MMOL/L (ref 100–109)
CHOLEST SERPL-MCNC: 169 MG/DL
CHOLEST/HDLC SERPL: 2.3 {RATIO}
CO2 SERPL-SCNC: 27 MMOL/L (ref 21–31)
CREAT SERPL-MCNC: 1.07 MG/DL (ref 0.4–1.1)
CYTOLOGY CMNT CVX/VAG CYTO-IMP: ABNORMAL
DIFFERENTIAL METHOD BLD: ABNORMAL
EOSINOPHIL # BLD AUTO: 0.1 THOU/CMM (ref 0–0.5)
EOSINOPHIL NFR BLD AUTO: 2 %
ERYTHROCYTE [DISTWIDTH] IN BLOOD BY AUTOMATED COUNT: 12.6 % (ref 12–16)
FERRITIN SERPL-MCNC: 29 NG/ML (ref 11–306.8)
GFR/BSA.PRED SERPLBLD CYS-BASED-ARV: 67 ML/MIN/{1.73_M2}
GLUCOSE SERPL-MCNC: 98 MG/DL (ref 65–99)
HCT VFR BLD AUTO: 45.4 % (ref 35–43)
HCV AB SERPL QL IA: NONREACTIVE
HDLC SERPL-MCNC: 72 MG/DL (ref 23–92)
HGB BLD-MCNC: 15.4 G/DL (ref 11.5–14.5)
IRON SATN MFR SERPL: 21 % (ref 20–50)
IRON SERPL-MCNC: 69 UG/DL (ref 50–212)
LDLC SERPL CALC-MCNC: 81 MG/DL
LYMPHOCYTES # BLD AUTO: 2 THOU/CMM (ref 1–3)
LYMPHOCYTES NFR BLD AUTO: 30 %
MAGNESIUM SERPL-MCNC: 2 MG/DL (ref 1.4–2.2)
MCH RBC QN AUTO: 32.5 PG (ref 26–34)
MCHC RBC AUTO-ENTMCNC: 33.9 G/DL (ref 32–37)
MCV RBC AUTO: 96 FL (ref 80–100)
MONOCYTES # BLD AUTO: 0.6 THOU/CMM (ref 0.3–1)
MONOCYTES NFR BLD AUTO: 8 %
NEUTROPHILS # BLD AUTO: 3.9 THOU/CMM (ref 1.8–7.8)
NEUTROPHILS NFR BLD AUTO: 59 %
NONHDLC SERPL-MCNC: 97 MG/DL
PLATELET # BLD AUTO: 187 THOU/CMM (ref 140–350)
PMV BLD REES-ECKER: 11.1 FL (ref 7.5–11.3)
POTASSIUM SERPL-SCNC: 4.2 MMOL/L (ref 3.5–5.2)
PROT SERPL-MCNC: 6.2 G/DL (ref 6.3–8.3)
RBC # BLD AUTO: 4.73 MILL/CMM (ref 3.7–4.7)
SODIUM SERPL-SCNC: 140 MMOL/L (ref 135–145)
T4 FREE SERPL-MCNC: 0.99 NG/DL (ref 0.61–1.12)
TIBC SERPL-MCNC: 326 UG/DL (ref 260–430)
TRANSFERRIN SERPL-MCNC: 233 MG/DL (ref 203–362)
TRIGL SERPL-MCNC: 78 MG/DL
TSH SERPL-ACNC: 2.58 UIU/ML (ref 0.45–5.33)
WBC # BLD AUTO: 6.7 THOU/CMM (ref 4–10)

## 2024-07-10 DIAGNOSIS — G25.81 RESTLESS LEG SYNDROME: Primary | ICD-10-CM

## 2024-07-10 RX ORDER — ROPINIROLE 0.25 MG/1
0.25 TABLET, FILM COATED ORAL
Qty: 30 TABLET | Refills: 0 | Status: SHIPPED | OUTPATIENT
Start: 2024-07-10

## 2024-08-16 DIAGNOSIS — E03.9 HYPOTHYROIDISM, UNSPECIFIED TYPE: ICD-10-CM

## 2024-08-16 RX ORDER — LEVOTHYROXINE SODIUM 25 UG/1
25 TABLET ORAL DAILY
Qty: 90 TABLET | Refills: 1 | Status: SHIPPED | OUTPATIENT
Start: 2024-08-16

## 2024-08-16 NOTE — TELEPHONE ENCOUNTER
Reason for call:   [x] Refill   [] Prior Auth  [] Other:     Office:   [x] PCP/Provider - [facility]  [] Specialty/Provider -     Medication: levothyroxine 25 mcg tablet     Dose/Frequency: 25 mcg, Daily     Quantity: 100     Pharmacy: [id number]    Does the patient have enough for 3 days?   [] Yes   [x] No - Send as HP to POD

## 2024-08-19 ENCOUNTER — PATIENT MESSAGE (OUTPATIENT)
Dept: FAMILY MEDICINE CLINIC | Facility: CLINIC | Age: 44
End: 2024-08-19

## 2024-08-26 DIAGNOSIS — G25.81 RESTLESS LEG SYNDROME: ICD-10-CM

## 2024-08-26 DIAGNOSIS — F41.1 GAD (GENERALIZED ANXIETY DISORDER): ICD-10-CM

## 2024-08-27 DIAGNOSIS — F10.11 HISTORY OF ALCOHOL ABUSE: ICD-10-CM

## 2024-08-27 RX ORDER — DISULFIRAM 250 MG/1
250 TABLET ORAL DAILY
Qty: 90 TABLET | Refills: 1 | Status: SHIPPED | OUTPATIENT
Start: 2024-08-27

## 2024-08-28 RX ORDER — ROPINIROLE 0.25 MG/1
0.25 TABLET, FILM COATED ORAL
Qty: 30 TABLET | Refills: 0 | Status: SHIPPED | OUTPATIENT
Start: 2024-08-28

## 2024-08-28 RX ORDER — SERTRALINE HYDROCHLORIDE 100 MG/1
TABLET, FILM COATED ORAL
Qty: 135 TABLET | Refills: 0 | Status: SHIPPED | OUTPATIENT
Start: 2024-08-28

## 2024-08-28 NOTE — TELEPHONE ENCOUNTER
Patient called requesting refill for sertraline/ropinirole. Patient made aware medication was refilled on [facility] for to [facility] pharmacy. Patient instructed to contact the pharmacy to obtain refills of medication. Patient verbalized understanding.

## 2024-09-21 DIAGNOSIS — F41.1 GAD (GENERALIZED ANXIETY DISORDER): ICD-10-CM

## 2024-09-21 DIAGNOSIS — G25.81 RESTLESS LEG SYNDROME: ICD-10-CM

## 2024-09-21 DIAGNOSIS — E03.9 HYPOTHYROIDISM, UNSPECIFIED TYPE: ICD-10-CM

## 2024-09-23 RX ORDER — LEVOTHYROXINE SODIUM 25 UG/1
25 TABLET ORAL DAILY
Qty: 90 TABLET | Refills: 1 | Status: SHIPPED | OUTPATIENT
Start: 2024-09-23

## 2024-09-23 RX ORDER — ROPINIROLE 0.25 MG/1
0.25 TABLET, FILM COATED ORAL
Qty: 30 TABLET | Refills: 5 | Status: SHIPPED | OUTPATIENT
Start: 2024-09-23

## 2024-09-23 RX ORDER — SERTRALINE HYDROCHLORIDE 100 MG/1
TABLET, FILM COATED ORAL
Qty: 45 TABLET | Refills: 5 | Status: SHIPPED | OUTPATIENT
Start: 2024-09-23

## 2024-09-24 ENCOUNTER — OFFICE VISIT (OUTPATIENT)
Dept: FAMILY MEDICINE CLINIC | Facility: CLINIC | Age: 44
End: 2024-09-24
Payer: COMMERCIAL

## 2024-09-24 VITALS
OXYGEN SATURATION: 99 % | HEIGHT: 63 IN | TEMPERATURE: 97.7 F | WEIGHT: 129.6 LBS | SYSTOLIC BLOOD PRESSURE: 146 MMHG | DIASTOLIC BLOOD PRESSURE: 92 MMHG | HEART RATE: 64 BPM | BODY MASS INDEX: 22.96 KG/M2

## 2024-09-24 DIAGNOSIS — M26.609 TMJ DYSFUNCTION: Primary | ICD-10-CM

## 2024-09-24 PROCEDURE — 99214 OFFICE O/P EST MOD 30 MIN: CPT

## 2024-09-24 RX ORDER — METHOCARBAMOL 500 MG/1
500 TABLET, FILM COATED ORAL 4 TIMES DAILY
Qty: 60 TABLET | Refills: 0 | Status: SHIPPED | OUTPATIENT
Start: 2024-09-24

## 2024-09-24 RX ORDER — MELOXICAM 15 MG/1
15 TABLET ORAL DAILY PRN
Qty: 30 TABLET | Refills: 3 | Status: SHIPPED | OUTPATIENT
Start: 2024-09-24

## 2024-09-24 NOTE — PROGRESS NOTES
Ambulatory Visit  Name: Eneida Umanzor      : 1980      MRN: [id number]  Encounter Provider: Kezia Fernandez PA-C  Encounter Date: 2024   Encounter department: [facility]    Assessment & Plan  TMJ dysfunction  Patient presents today with concerns of right ear pain has been going on for several weeks, not helped with steroid as prescribed by urgent care.  Discussed with patient that her ENT exam today is within normal limits with no findings in the right ear.  With pain radiating into the right jaw, I am more suspicious of a TMJ dysfunction at this time.  Patient will be seeing a chiropractor later today to help with jaw pain.  She is also seeing an ENT in October.  Recommend use of a muscle relaxer throughout the day as needed for pain.  Also will start anti-inflammatory such as Mobic to help with any inflammation around the TMJ joint.  May suggest physical therapy in the future if ENT also feels this is TMJ related.  Patient is agreeable with plan today and will keep us updated on how her symptoms are.  Orders:    methocarbamol (ROBAXIN) 500 mg tablet; Take 1 tablet (500 mg total) by mouth 4 (four) times a day    meloxicam (MOBIC) 15 mg tablet; Take 1 tablet (15 mg total) by mouth daily as needed for moderate pain       History of Present Illness     Patient presents today for evaluation of right ear pain which has been going on for several weeks now.  She was seen at urgent care last week and was started on a steroid for eustachian tube dysfunction however pain is not better.  She states pain seems to be worse at night and radiates into the jaw.  She states the back of her throat also hurts.  She sees an ENT in October but states pain does not seem to be improving with ibuprofen.  She states hot drinks tend to make it worse and drinking water may help the pain.  She has used some eardrops without any benefit.  She wakes up feeling like there is fluid in her ear.  When she  "is sitting upright it is better.  She states her teeth hurt as well.  She is going to a chiropractor today to take a look at her.          Review of Systems   Constitutional:  Negative for chills, fatigue and fever.   HENT:  Positive for ear pain. Negative for congestion, dental problem, hearing loss, postnasal drip, sinus pressure, sinus pain and sore throat.    Respiratory:  Negative for cough, chest tightness and shortness of breath.    Cardiovascular:  Negative for chest pain, palpitations and leg swelling.   Neurological:  Negative for dizziness, light-headedness and headaches.           Objective     /92 (BP Location: Left arm, Patient Position: Sitting, Cuff Size: Adult)   Pulse 64   Temp 97.7 °F (36.5 °C)   Ht 5' 3\" (1.6 m)   Wt 58.8 kg (129 lb 9.6 oz)   LMP 09/17/2024 (Approximate)   SpO2 99%   BMI 22.96 kg/m²     Physical Exam  Vitals and nursing note reviewed.   Constitutional:       General: She is not in acute distress.     Appearance: Normal appearance. She is normal weight. She is not ill-appearing.   HENT:      Head: Normocephalic and atraumatic.      Jaw: Tenderness and pain on movement present. No trismus, swelling or malocclusion.      Right Ear: Tympanic membrane and external ear normal. There is no impacted cerumen.      Left Ear: Tympanic membrane and external ear normal. There is no impacted cerumen.      Nose: Nose normal.      Mouth/Throat:      Mouth: Mucous membranes are moist.      Pharynx: Oropharynx is clear. Uvula midline.      Tonsils: No tonsillar exudate.   Cardiovascular:      Rate and Rhythm: Normal rate and regular rhythm.   Pulmonary:      Effort: Pulmonary effort is normal. No respiratory distress.      Breath sounds: Normal breath sounds.   Musculoskeletal:      Right lower leg: No edema.      Left lower leg: No edema.   Skin:     General: Skin is warm and dry.      Coloration: Skin is not cyanotic or pale.   Neurological:      General: No focal deficit present. "      Mental Status: She is alert and oriented to person, place, and time. Mental status is at baseline.   Psychiatric:         Mood and Affect: Mood normal.         Behavior: Behavior normal.         Judgment: Judgment normal.

## 2024-10-08 ENCOUNTER — VBI (OUTPATIENT)
Dept: ADMINISTRATIVE | Facility: OTHER | Age: 44
End: 2024-10-08

## 2024-10-08 NOTE — TELEPHONE ENCOUNTER
10/08/24 2:10 PM     Chart reviewed for Pap Smear (HPV) aka Cervical Cancer Screening was/were not submitted to the patient's insurance.     Judi Hill MA   [facility]

## 2024-12-23 DIAGNOSIS — F41.1 GAD (GENERALIZED ANXIETY DISORDER): ICD-10-CM

## 2024-12-23 DIAGNOSIS — G25.81 RESTLESS LEG SYNDROME: ICD-10-CM

## 2024-12-23 RX ORDER — SERTRALINE HYDROCHLORIDE 100 MG/1
TABLET, FILM COATED ORAL
Qty: 135 TABLET | Refills: 3 | Status: SHIPPED | OUTPATIENT
Start: 2024-12-23

## 2024-12-23 RX ORDER — ROPINIROLE 0.25 MG/1
0.25 TABLET, FILM COATED ORAL
Qty: 90 TABLET | Refills: 3 | Status: SHIPPED | OUTPATIENT
Start: 2024-12-23

## 2024-12-30 DIAGNOSIS — E03.9 HYPOTHYROIDISM, UNSPECIFIED TYPE: ICD-10-CM

## 2024-12-30 RX ORDER — LEVOTHYROXINE SODIUM 25 UG/1
25 TABLET ORAL DAILY
Qty: 90 TABLET | Refills: 1 | Status: SHIPPED | OUTPATIENT
Start: 2024-12-30

## 2025-02-03 ENCOUNTER — OFFICE VISIT (OUTPATIENT)
Dept: FAMILY MEDICINE CLINIC | Facility: CLINIC | Age: 45
End: 2025-02-03
Payer: COMMERCIAL

## 2025-02-03 VITALS
TEMPERATURE: 98.3 F | BODY MASS INDEX: 23.14 KG/M2 | WEIGHT: 130.6 LBS | SYSTOLIC BLOOD PRESSURE: 130 MMHG | HEIGHT: 63 IN | DIASTOLIC BLOOD PRESSURE: 88 MMHG

## 2025-02-03 DIAGNOSIS — G25.81 RESTLESS LEGS SYNDROME: ICD-10-CM

## 2025-02-03 DIAGNOSIS — Z13.6 SCREENING FOR CARDIOVASCULAR CONDITION: ICD-10-CM

## 2025-02-03 DIAGNOSIS — Z12.31 ENCOUNTER FOR SCREENING MAMMOGRAM FOR BREAST CANCER: ICD-10-CM

## 2025-02-03 DIAGNOSIS — F41.1 GAD (GENERALIZED ANXIETY DISORDER): ICD-10-CM

## 2025-02-03 DIAGNOSIS — Z00.00 ADULT GENERAL MEDICAL EXAMINATION: Primary | ICD-10-CM

## 2025-02-03 DIAGNOSIS — Z13.1 SCREENING FOR DIABETES MELLITUS: ICD-10-CM

## 2025-02-03 DIAGNOSIS — E03.9 HYPOTHYROIDISM, UNSPECIFIED TYPE: ICD-10-CM

## 2025-02-03 DIAGNOSIS — Z13.0 SCREENING FOR DEFICIENCY ANEMIA: ICD-10-CM

## 2025-02-03 DIAGNOSIS — F10.11 HISTORY OF ALCOHOL ABUSE: ICD-10-CM

## 2025-02-03 PROBLEM — J02.9 SORE THROAT: Status: RESOLVED | Noted: 2023-01-10 | Resolved: 2025-02-03

## 2025-02-03 PROBLEM — F41.9 ANXIETY: Status: RESOLVED | Noted: 2017-12-06 | Resolved: 2025-02-03

## 2025-02-03 PROCEDURE — 99396 PREV VISIT EST AGE 40-64: CPT

## 2025-02-03 PROCEDURE — 99214 OFFICE O/P EST MOD 30 MIN: CPT

## 2025-02-03 NOTE — ASSESSMENT & PLAN NOTE
Continues to do well with Zoloft 150mg. Reports no changes in mental health. No SI or HI reported.

## 2025-02-03 NOTE — PROGRESS NOTES
Adult Annual Physical  Name: Eneida Umanzor      : 1980      MRN: [id number]  Encounter Provider: Kezia Fernandez PA-C  Encounter Date: 2/3/2025   Encounter department: [facility]    Assessment & Plan  Adult general medical examination  Patient presents today for an annual physical. Patient's medical history and medication list were reviewed and updated. Annual physical questionnaire was given to review current diet, exercise level, sleep health, dental health, visual health, hearing status.    Preventative health needs were reviewed and assessed today:   Immunizations:   Flu - defers  COVID -defers  Tdap - UTD     Mammogram - order placed   Pap - will be going to [facility], patient does have concern of vaginal odor typically occurring around menses that she will be discussing with GYN     Lab orders placed for next follow up appointment. Physical examination stable today, VSS. Encouraged continued healthy diet and exercise. Continue regular vision and dental appointments.     Follow up in 6 months for next medication check, sooner with any acute concerns.        Hypothyroidism, unspecified type  TSH stable in July. Continue currently medication, will have labs repeated prior to next visit.   Orders:  •  TSH, 3rd generation with Free T4 reflex; Future  •  T4, free; Future    MARIANO (generalized anxiety disorder)  Continues to do well with Zoloft 150mg. Reports no changes in mental health. No SI or HI reported.        Restless legs syndrome  Patient noted good improvement with ropinirole, continue treatment at current dose.        History of alcohol abuse  Patient is presently 3 years sober, doing well with Antabuse. Continue current treatment. Congratulated patient on continued sobriety.        Encounter for screening mammogram for breast cancer    Orders:  •  Mammo screening bilateral w 3d and cad; Future    Screening for deficiency anemia    Orders:  •  CBC and  differential; Future    Screening for diabetes mellitus    Orders:  •  Comprehensive metabolic panel; Future    Screening for cardiovascular condition    Orders:  •  Lipid Panel with Direct LDL reflex; Future    Immunizations and preventive care screenings were discussed with patient today. Appropriate education was printed on patient's after visit summary.    Counseling:  Alcohol/drug use: discussed moderation in alcohol intake, the recommendations for healthy alcohol use, and avoidance of illicit drug use.  Dental Health: discussed importance of regular tooth brushing, flossing, and dental visits.  Injury prevention: discussed safety/seat belts, safety helmets, smoke detectors, carbon monoxide detectors, and smoking near bedding or upholstery.  Sexual health: discussed sexually transmitted diseases, partner selection, use of condoms, avoidance of unintended pregnancy, and contraceptive alternatives.  Exercise: the importance of regular exercise/physical activity was discussed. Recommend exercise 3-5 times per week for at least 30 minutes.          History of Present Illness     Adult Annual Physical:  Patient presents for annual physical. No concerns today, has been doing well since last visit. .     Diet and Physical Activity:  - Diet/Nutrition: well balanced diet, consuming 3-5 servings of fruits/vegetables daily, adequate whole grain intake and adequate fiber intake.  - Exercise: strength training exercises, moderate cardiovascular exercise, 30-60 minutes on average and 5-7 times a week on average.    General Health:  - Sleep: sleeps well and 7-8 hours of sleep on average.  - Hearing: normal hearing bilateral ears.  - Vision: wears glasses and goes for regular eye exams.  - Dental: regular dental visits and brushes teeth twice daily.    /GYN Health:  - Follows with GYN: yes.   - Menopause: premenopausal.   - Last menstrual cycle: 1/2/2025.   - History of STDs: no  - Contraception: male partner had vasectomy.   "    Review of Systems   Constitutional:  Negative for chills, fever and unexpected weight change.   Respiratory:  Negative for cough, chest tightness and shortness of breath.    Cardiovascular:  Negative for chest pain and palpitations.   Gastrointestinal:  Negative for abdominal pain, blood in stool and vomiting.   Genitourinary:  Negative for dysuria, hematuria and menstrual problem.   Musculoskeletal:  Negative for arthralgias, back pain and myalgias.   Neurological:  Negative for dizziness, seizures, syncope and headaches.   Hematological:  Does not bruise/bleed easily.   Psychiatric/Behavioral:  Negative for behavioral problems and confusion.    All other systems reviewed and are negative.        Objective   /88 (BP Location: Left arm, Patient Position: Sitting, Cuff Size: Standard)   Temp 98.3 °F (36.8 °C) (Temporal)   Ht 5' 3\" (1.6 m)   Wt 59.2 kg (130 lb 9.6 oz)   BMI 23.13 kg/m²     Physical Exam  Vitals and nursing note reviewed.   Constitutional:       General: She is not in acute distress.     Appearance: Normal appearance. She is well-developed. She is not ill-appearing.   HENT:      Head: Normocephalic and atraumatic.      Right Ear: Tympanic membrane normal.      Left Ear: Tympanic membrane normal.      Nose: Nose normal.      Mouth/Throat:      Mouth: Mucous membranes are moist.      Pharynx: Oropharynx is clear. No posterior oropharyngeal erythema.   Eyes:      Pupils: Pupils are equal, round, and reactive to light.   Neck:      Vascular: No carotid bruit.   Cardiovascular:      Rate and Rhythm: Normal rate and regular rhythm.      Pulses: Normal pulses.      Heart sounds: No murmur heard.  Pulmonary:      Effort: Pulmonary effort is normal. No respiratory distress.      Breath sounds: Normal breath sounds.   Abdominal:      General: Bowel sounds are normal. There is no distension.      Palpations: Abdomen is soft.      Tenderness: There is no abdominal tenderness.   Musculoskeletal:       "   General: No swelling. Normal range of motion.      Cervical back: Normal range of motion.      Right lower leg: No edema.      Left lower leg: No edema.   Lymphadenopathy:      Cervical: No cervical adenopathy.   Skin:     General: Skin is warm and dry.   Neurological:      General: No focal deficit present.      Mental Status: She is alert and oriented to person, place, and time. Mental status is at baseline.   Psychiatric:         Mood and Affect: Mood normal.         Behavior: Behavior normal.         Cognition and Memory: Cognition normal.         Judgment: Judgment normal.

## 2025-02-03 NOTE — ASSESSMENT & PLAN NOTE
Patient is presently 3 years sober, doing well with Antabuse. Continue current treatment. Congratulated patient on continued sobriety.

## 2025-02-03 NOTE — ASSESSMENT & PLAN NOTE
TSH stable in July. Continue currently medication, will have labs repeated prior to next visit.   Orders:  •  TSH, 3rd generation with Free T4 reflex; Future  •  T4, free; Future

## 2025-02-24 DIAGNOSIS — F10.11 HISTORY OF ALCOHOL ABUSE: ICD-10-CM

## 2025-02-24 RX ORDER — DISULFIRAM 250 MG/1
250 TABLET ORAL DAILY
Qty: 90 TABLET | Refills: 1 | Status: SHIPPED | OUTPATIENT
Start: 2025-02-24

## 2025-03-16 ENCOUNTER — TELEPHONE (OUTPATIENT)
Dept: UROLOGY | Facility: AMBULATORY SURGERY CENTER | Age: 45
End: 2025-03-16

## 2025-03-16 DIAGNOSIS — I99.8 ISCHEMIA OF FOOT: Primary | ICD-10-CM

## 2025-03-16 NOTE — TELEPHONE ENCOUNTER
Eneida is a 44 yr old female with recurrent leg weakness with running/exercise.  She has been evaluated by Dr. Freeman at [facility] who believes there may be a vascular component to her symptoms as her toes often turn white when she has an event.  A consult to vascular surgery has been recommended.  Eneida has reached out to me for assistance for a referral at [facility].  With Eneida's consent, I am placing a phone note and a consult to Dr. Elif Hernández from vascular surgery for evaluation.

## 2025-03-16 NOTE — TELEPHONE ENCOUNTER
Eneida is a 44 yr old female with recurrent leg weakness with running/exercise.  She has been evaluated by Dr. Freeman at [facility] who believes there may be a vascular component to her symptoms as her toes often turn white when she has an event.  A consult to vascular surgery has been recommended.  Eneida has reached out to me for assistance for a referral at [facility].  With Eneida's consent, I am placing a phone note and a consult to Dr. Elif Hernández from vascular surgery for evaluation.

## 2025-05-12 ENCOUNTER — PATIENT MESSAGE (OUTPATIENT)
Dept: FAMILY MEDICINE CLINIC | Facility: CLINIC | Age: 45
End: 2025-05-12

## 2025-05-12 ENCOUNTER — OFFICE VISIT (OUTPATIENT)
Dept: FAMILY MEDICINE CLINIC | Facility: CLINIC | Age: 45
End: 2025-05-12
Payer: COMMERCIAL

## 2025-05-12 VITALS
HEIGHT: 63 IN | BODY MASS INDEX: 22.68 KG/M2 | WEIGHT: 128 LBS | SYSTOLIC BLOOD PRESSURE: 134 MMHG | OXYGEN SATURATION: 98 % | TEMPERATURE: 98.2 F | DIASTOLIC BLOOD PRESSURE: 84 MMHG | HEART RATE: 75 BPM

## 2025-05-12 DIAGNOSIS — F10.11 HISTORY OF ALCOHOL ABUSE: ICD-10-CM

## 2025-05-12 DIAGNOSIS — E04.1 THYROID NODULE: ICD-10-CM

## 2025-05-12 DIAGNOSIS — Z13.1 SCREENING FOR DIABETES MELLITUS: ICD-10-CM

## 2025-05-12 DIAGNOSIS — Z13.220 SCREENING CHOLESTEROL LEVEL: ICD-10-CM

## 2025-05-12 DIAGNOSIS — F41.1 GAD (GENERALIZED ANXIETY DISORDER): ICD-10-CM

## 2025-05-12 DIAGNOSIS — R20.2 PARESTHESIA OF RIGHT LOWER EXTREMITY: Primary | ICD-10-CM

## 2025-05-12 DIAGNOSIS — E03.9 HYPOTHYROIDISM, UNSPECIFIED TYPE: ICD-10-CM

## 2025-05-12 DIAGNOSIS — Z13.0 SCREENING FOR DEFICIENCY ANEMIA: ICD-10-CM

## 2025-05-12 PROCEDURE — 99214 OFFICE O/P EST MOD 30 MIN: CPT | Performed by: FAMILY MEDICINE

## 2025-05-12 RX ORDER — SERTRALINE HYDROCHLORIDE 100 MG/1
TABLET, FILM COATED ORAL
Start: 2025-05-12

## 2025-05-12 NOTE — ASSESSMENT & PLAN NOTE
She has been experiencing numbness/heaviness in her right leg for the past 1-1/2 years.  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  Being followed by Ortho at [facility].  She had x-rays and MRI right hip.  She has also had a few injections without improvement.  Recommend checking EMG right lower extremity.  Patient also may benefit from MRI lumbar spine in the future.  Also consider trial gabapentin.  Will call with results.     Orders:  •  EMG; Future

## 2025-05-12 NOTE — ASSESSMENT & PLAN NOTE
History of detox and rehab stay November 2020.  Patient has not had any alcohol since this time.  She continues to take Antabuse 250 mg daily.  Discussed possibly discontinuing this med.  She will consider.

## 2025-05-12 NOTE — ASSESSMENT & PLAN NOTE
History of 2 small thyroid nodules have been stable.  Last ultrasound from December 2023.  Recommend repeating end of 2025 (order placed.  Orders:  •  US thyroid; Future

## 2025-05-12 NOTE — ASSESSMENT & PLAN NOTE
Doing well on levothyroxine 25 mcg daily.  Patient is due for labs.  Orders placed today.  Will call with results  Orders:  •  TSH, 3rd generation with Free T4 reflex; Future

## 2025-05-12 NOTE — ASSESSMENT & PLAN NOTE
Doing well on sertraline 100 mg daily  Orders:  •  sertraline (ZOLOFT) 100 mg tablet; take 1 tab qd

## 2025-05-12 NOTE — PROGRESS NOTES
Name: Eneida Umanzor      : 1980      MRN: [id number]  Encounter Provider: Obey Majano DO  Encounter Date: 2025   Encounter department: [facility]    Assessment & Plan  Paresthesia of right lower extremity  She has been experiencing numbness/heaviness in her right leg for the past 1-1/2 years.  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  Being followed by Ortho at [facility].  She had x-rays and MRI right hip.  She has also had a few injections without improvement.  Recommend checking EMG right lower extremity.  Patient also may benefit from MRI lumbar spine in the future.  Also consider trial gabapentin.  Will call with results.     Orders:  •  EMG; Future    History of alcohol abuse  History of detox and rehab stay 2020.  Patient has not had any alcohol since this time.  She continues to take Antabuse 250 mg daily.  Discussed possibly discontinuing this med.  She will consider.       Thyroid nodule  History of 2 small thyroid nodules have been stable.  Last ultrasound from 2023.  Recommend repeating end of  (order placed.  Orders:  •  US thyroid; Future    MARIANO (generalized anxiety disorder)  Doing well on sertraline 100 mg daily  Orders:  •  sertraline (ZOLOFT) 100 mg tablet; take 1 tab qd    Hypothyroidism, unspecified type  Doing well on levothyroxine 25 mcg daily.  Patient is due for labs.  Orders placed today.  Will call with results  Orders:  •  TSH, 3rd generation with Free T4 reflex; Future    Screening for deficiency anemia    Orders:  •  CBC and differential; Future    Screening for diabetes mellitus    Orders:  •  Comprehensive metabolic panel; Future    Screening cholesterol level    Orders:  •  Lipid Panel with Direct LDL reflex; Future         Current with breast cancer screening    Last tetanus booster 2017    Orders for routine fasting blood work placed.  Will call with results    6 months, PE      History of Present Illness     Patient presents for  recheck of chronic medical problems.  She has been experiencing numbness/heaviness in her right leg for the past 1-1/2 years.  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  Being followed by Ortho at [facility].  She had x-rays and MRI right hip.  She has also had a few injections without improvement.    Otherwise she is doing well.  She is compliant with prescribed medications.  She no longer drinks alcohol.      Review of Systems   Respiratory: Negative.     Cardiovascular: Negative.    Gastrointestinal: Negative.    Genitourinary: Negative.      Past Medical History:   Diagnosis Date   • Atypical lymphocytosis     last assessed: 4/28/2015   • Baker cyst     last assessed: 4/13/2015   • Chronic fatigue     last assessed: 2/3/2016   • Disease of thyroid gland    • Elevated LFTs     last assessed: 4/28/2015   • Female infertility    • Hypothyroidism     last assessed: 12/24/2015   • Night sweats     last assessed: 4/22/2015   • Shortness of breath     last assessed: 4/22/2015     Past Surgical History:   Procedure Laterality Date   • ANTERIOR CRUCIATE LIGAMENT REPAIR Right     primary repair of knee   • COLPOSCOPY     • DILATION AND CURETTAGE OF UTERUS     • KNEE ARTHROSCOPY W/ ACL RECONSTRUCTION      right knee   • OVARIAN CYST REMOVAL     • WISDOM TOOTH EXTRACTION       Family History   Problem Relation Age of Onset   • Thyroid disease unspecified Mother    • Diabetes Mother         mellitus   • Prostate cancer Father    • Thyroid disease unspecified Sister    • Diabetes type II Maternal Grandmother    • Diabetes unspecified Maternal Grandfather    • Diabetes unspecified Maternal Uncle    • Cancer Maternal Uncle    • Breast cancer Neg Hx      Social History     Tobacco Use   • Smoking status: Never     Passive exposure: Never   • Smokeless tobacco: Never   Vaping Use   • Vaping status: Never Used   Substance and Sexual Activity   • Alcohol use: No   • Drug use: No   • Sexual activity: Yes     Partners: Male     Current Outpatient  "Medications on File Prior to Visit   Medication Sig   • disulfiram (ANTABUSE) 250 mg tablet TAKE 1 TABLET DAILY   • levothyroxine 25 mcg tablet Take 1 tablet (25 mcg total) by mouth daily   • rOPINIRole (REQUIP) 0.25 mg tablet Take 1 tablet (0.25 mg total) by mouth daily at bedtime   • [DISCONTINUED] sertraline (ZOLOFT) 100 mg tablet take 1 1/2 tabs daily     No Known Allergies  Immunization History   Administered Date(s) Administered   • COVID-19 MODERNA VACC 0.5 ML IM 01/20/2021, 02/18/2021, 11/11/2021   • COVID-19 Pfizer Vac BIVALENT Fadi-sucrose 12 Yr+ IM 10/24/2022   • Hep B, adult 03/29/2013   • INFLUENZA 09/01/2012, 09/25/2014, 12/24/2015, 09/01/2016, 09/12/2017, 10/24/2022   • Influenza Quadrivalent Preservative Free 3 years and older IM 09/25/2014   • Influenza Quadrivalent, 6-35 Months IM 12/24/2015, 09/01/2016, 09/01/2017   • Influenza, injectable, quadrivalent, preservative free 0.5 mL 10/09/2019, 10/29/2020, 11/27/2023   • Influenza, recombinant, quadrivalent,injectable, preservative free 09/24/2018   • Tdap 01/01/2011, 06/14/2013, 08/28/2017   • Tuberculin Skin Test-PPD Intradermal 03/22/2013     Objective   /84   Pulse 75   Temp 98.2 °F (36.8 °C) (Temporal)   Ht 5' 3\" (1.6 m)   Wt 58.1 kg (128 lb)   LMP 05/07/2025 (Exact Date)   SpO2 98%   BMI 22.67 kg/m²     Physical Exam  Cardiovascular:      Rate and Rhythm: Normal rate and regular rhythm.      Heart sounds: Normal heart sounds.      Comments: Carotids: no bruits  Ext: no edema  Pulmonary:      Effort: Pulmonary effort is normal. No respiratory distress.      Breath sounds: No wheezing or rales.   Psychiatric:         Behavior: Behavior normal.         Thought Content: Thought content normal.         "

## 2025-05-13 ENCOUNTER — TELEPHONE (OUTPATIENT)
Dept: ADMINISTRATIVE | Facility: OTHER | Age: 45
End: 2025-05-13

## 2025-05-13 NOTE — LETTER
Procedure Request Form: Cervical Cancer Screening      Date Requested: 25  Patient: Eneida Umanzor  Patient : 1980   Referring Provider: Obey Majano, DO        Date of Procedure ______________________________       The above patient has informed us that they have completed their   most recent Cervical Cancer Screening at your facility. Please complete   this form and attach all corresponding procedure reports/results.    Comments __________________________________________________________  ____________________________________________________________________  ____________________________________________________________________  ____________________________________________________________________    Facility Completing Procedure _________________________________________    Form Completed By (print name) _______________________________________      Signature __________________________________________________________      These reports are needed for  compliance.    Please fax this completed form and a copy of the procedure report to the [facility] Based Department as soon as possible via Fax [phone], sissy Lau: Phone [phone]. Our office is located at [address] [place] [address].    We thank you for your assistance in treating our mutual patient.

## 2025-05-13 NOTE — TELEPHONE ENCOUNTER
----- Message from Angela LIN sent at 5/12/2025 12:56 PM EDT -----  Regarding: Care Gap Request  05/12/25 12:56 PM    Hello, our patient Eneida Umanzor has had Pap Smear (HPV) aka Cervical Cancer Screening completed/performed. Please assist in updating the patient chart by making an External outreach to [facility] facility located in [place]. The date of service is approx 2 months ago.    Thank you,  Angela Payton  [facility]

## 2025-05-13 NOTE — TELEPHONE ENCOUNTER
Upon review of the In Basket request and the patient's chart, initial outreach has been made via fax to facility. Please see Contacts section for details.     Thank you  Judi Hill MA

## 2025-05-14 NOTE — TELEPHONE ENCOUNTER
Upon review of the In Basket request we were able to locate, review, and update the patient chart as requested for Pap Smear (HPV) aka Cervical Cancer Screening.    Any additional questions or concerns should be emailed to the Practice Liaisons via the appropriate education email address, please do not reply via In Basket.    Thank you  Judi Hill MA   [facility]

## 2025-05-19 ENCOUNTER — TELEPHONE (OUTPATIENT)
Age: 45
End: 2025-05-19

## 2025-05-19 NOTE — TELEPHONE ENCOUNTER
[facility] patient has not responded to any phone calls made to patient regarding the EMG referral KARL

## 2025-05-21 NOTE — TELEPHONE ENCOUNTER
Please notify patient that tyshawn Suresh has been trying to get in touch with her regarding her EMG

## 2025-05-30 NOTE — PROGRESS NOTES
"Name: Enieda Umanzor      : 1980      MRN: [id number]  Encounter Provider: Elif Hernández MD  Encounter Date: 2025   Encounter department: [facility]  :  Assessment & Plan    Pt is a 45 yo F w/ hypothyroidism, anxiety, MDD, Raynaud's, presents with RLE numbness    Paresthesia of right lower extremity  Pain aggravated by exercise  Sensation of heaviness in extremities  -     VAS ARTERIAL DUPLEX- LOWER LIMB BILATERAL; Future  -     VAS abdominal aorta/iliac duplex; Future  -     VAS DM with exercise study; Future  -episode of RLE \"dead leg\" during crossfit competition 2 years ago and increasingly worse symptoms with running; symptoms from hip to toes  -pulse exam normal today and maneuvers for pop entrapment were negative  -given symptoms starting at the hip, seems less likely vascular cause but given high level of activity, will need to role out iliac endofibrosis, other anatomical compression, etc  -will get AOIL, LEADs, and exercise ABIs; patient instructed to make sure she does enough exercise to induce symptoms for the exercise ABIs which may require running outside  -f/u after testing complete  -continue workup with Dr. Barkley ([facility]) as well for spinal/nerve compression causes; per patient, had EMG as well with S1 abnormality but I cannot see this testing in our system    Raynaud's phenomenon without gangrene  -has coolness, palor of the B toes intermittently, sounds most consistent with Raynaud's but can't rule out conneciton to other symptoms      History of Present Illness   HPI:    Patient referred for evaluation of leg pain.    Patient does crossfit and was at a competition about 2 years ago where her RLE \"went dead\".  It is heavy and she can't pick it up.  Since that time, she gets this with running distances and this has become shorter and shorter.  She used to run half marathons and now has trouble running 1/4mile.  She is able to do most other fitness activities " "without issue and is still in very good shape.      She also notes paleness of the B toes frequently and some toe numbness which is not related to activity or temperature.    Never smoker      Eneida Umanzor is a 44 y.o. female who presents     Pt is new to our practice and ref by urology/ [place]. Pt states she is experiencing a \"dead leg\" feeling. Pt c/o RL numbness, heaviness, coldness, tingling. Pt states both feet go numb and white mainly in toes but also in the bottom. Pt denies wounds. Pt denies PREETHI.     History obtained from: patient    Review of Systems   Skin:  Negative for wound.          Objective   /82 (BP Location: Right arm, Patient Position: Sitting, Cuff Size: Standard)   Pulse 67   Ht 5' 3\" (1.6 m)   Wt 60.3 kg (133 lb)   LMP 05/07/2025 (Exact Date)   SpO2 98%   BMI 23.56 kg/m²      Physical Exam    Cardiovascular:      Rate and Rhythm: Normal rate and regular rhythm.      Pulses:           Radial pulses are 2+ on the right side and 2+ on the left side.        Femoral pulses are 2+ on the right side and 2+ on the left side.       Dorsalis pedis pulses are 2+ on the right side and 2+ on the left side.        Posterior tibial pulses are 2+ on the right side and 2+ on the left side.      Heart sounds: No murmur heard.     Comments: No change in pulse exam w/ forced plantar or dorsiflexion or calf or thigh muscle contraction  Pulmonary:      Effort: No respiratory distress.      Breath sounds: No wheezing or rales.     Musculoskeletal:      Right lower leg: No edema.      Left lower leg: No edema.     Skin:     Comments: No wounds, no skin changes             I have reviewed and made appropriate changes to the review of systems input by the medical assistant.    Vitals:    06/02/25 1138   BP: 128/82   BP Location: Right arm   Patient Position: Sitting   Cuff Size: Standard   Pulse: 67   SpO2: 98%   Weight: 60.3 kg (133 lb)   Height: 5' 3\" (1.6 m)       Problem List[1]    Past Surgical " History[2]    Family History[3]    Social History     Socioeconomic History   • Marital status: /Civil Union     Spouse name: Not on file   • Number of children: Not on file   • Years of education: Not on file   • Highest education level: Not on file   Occupational History   • Not on file   Tobacco Use   • Smoking status: Never     Passive exposure: Never   • Smokeless tobacco: Never   Vaping Use   • Vaping status: Never Used   Substance and Sexual Activity   • Alcohol use: No   • Drug use: No   • Sexual activity: Yes     Partners: Male   Other Topics Concern   • Not on file   Social History Narrative   • Not on file     Social Drivers of Health     Financial Resource Strain: Not on file   Food Insecurity: Not on file   Transportation Needs: Not on file   Physical Activity: Not on file   Stress: Not on file   Social Connections: Not on file   Intimate Partner Violence: Not on file   Housing Stability: Not on file       Allergies[4]    Current Medications[5]           [1]  Patient Active Problem List  Diagnosis   • Thyroid nodule   • Hypothyroidism   • Amenorrhea   • Female infertility   • MARIANO (generalized anxiety disorder)   • Lymphadenopathy   • MDD (major depressive disorder), recurrent episode, mild (HCC)   • Raynaud's phenomenon   • Synovial cyst of left popliteal space   • Insomnia   • Restless legs syndrome   • History of alcohol abuse   • Stress incontinence   • Transaminitis   • Urinary incontinence   • Paresthesia of right lower extremity   • Pain aggravated by exercise   • Sensation of heaviness in right lower extremities   [2]  Past Surgical History:  Procedure Laterality Date   • ANTERIOR CRUCIATE LIGAMENT REPAIR Right     primary repair of knee   • COLPOSCOPY     • DILATION AND CURETTAGE OF UTERUS     • KNEE ARTHROSCOPY W/ ACL RECONSTRUCTION      right knee   • OVARIAN CYST REMOVAL     • WISDOM TOOTH EXTRACTION     [3]  Family History  Problem Relation Name Age of Onset   • Thyroid disease  unspecified Mother     • Diabetes Mother          mellitus   • Prostate cancer Father     • Thyroid disease unspecified Sister     • Diabetes type II Maternal Grandmother     • Diabetes unspecified Maternal Grandfather     • Diabetes unspecified Maternal Uncle     • Cancer Maternal Uncle     • Breast cancer Neg Hx     [4]  No Known Allergies[5]    Current Outpatient Medications:   •  disulfiram (ANTABUSE) 250 mg tablet, TAKE 1 TABLET DAILY, Disp: 90 tablet, Rfl: 1  •  levothyroxine 25 mcg tablet, Take 1 tablet (25 mcg total) by mouth daily, Disp: 90 tablet, Rfl: 1  •  rOPINIRole (REQUIP) 0.25 mg tablet, Take 1 tablet (0.25 mg total) by mouth daily at bedtime, Disp: 90 tablet, Rfl: 3  •  sertraline (ZOLOFT) 100 mg tablet, take 1 tab qd, Disp: , Rfl:

## 2025-06-02 ENCOUNTER — OFFICE VISIT (OUTPATIENT)
Dept: VASCULAR SURGERY | Facility: CLINIC | Age: 45
End: 2025-06-02
Payer: COMMERCIAL

## 2025-06-02 VITALS
HEART RATE: 67 BPM | BODY MASS INDEX: 23.57 KG/M2 | WEIGHT: 133 LBS | OXYGEN SATURATION: 98 % | HEIGHT: 63 IN | DIASTOLIC BLOOD PRESSURE: 82 MMHG | SYSTOLIC BLOOD PRESSURE: 128 MMHG

## 2025-06-02 DIAGNOSIS — I73.00 RAYNAUD'S PHENOMENON WITHOUT GANGRENE: ICD-10-CM

## 2025-06-02 DIAGNOSIS — R29.898 SENSATION OF HEAVINESS IN EXTREMITIES: ICD-10-CM

## 2025-06-02 DIAGNOSIS — R52 PAIN AGGRAVATED BY EXERCISE: ICD-10-CM

## 2025-06-02 DIAGNOSIS — R20.2 PARESTHESIA OF RIGHT LOWER EXTREMITY: Primary | ICD-10-CM

## 2025-06-02 PROCEDURE — 99203 OFFICE O/P NEW LOW 30 MIN: CPT | Performed by: SURGERY

## 2025-06-02 NOTE — LETTER
June 2, 2025     Patient: Eneida Umanzor  YOB: 1980  Date of Visit: 6/2/2025      To Whom it May Concern:    Eneida Umanzor is under my professional care. Eneida was seen in my office on 6/2/2025. Eneida may return to work without limitations.    If you have any questions or concerns, please don't hesitate to call.         Sincerely,          Elif Hernández MD        CC: No Recipients  
all other ROS negative except as per HPI

## 2025-06-12 ENCOUNTER — HOSPITAL ENCOUNTER (OUTPATIENT)
Dept: MAMMOGRAPHY | Facility: MEDICAL CENTER | Age: 45
Discharge: HOME | End: 2025-06-12
Payer: COMMERCIAL

## 2025-06-12 VITALS — WEIGHT: 123 LBS | BODY MASS INDEX: 21.79 KG/M2 | HEIGHT: 63 IN

## 2025-06-12 DIAGNOSIS — Z12.31 ENCOUNTER FOR SCREENING MAMMOGRAM FOR BREAST CANCER: ICD-10-CM

## 2025-06-12 PROCEDURE — 77063 BREAST TOMOSYNTHESIS BI: CPT

## 2025-06-12 PROCEDURE — 77067 SCR MAMMO BI INCL CAD: CPT

## 2025-06-15 ENCOUNTER — RESULTS FOLLOW-UP (OUTPATIENT)
Dept: FAMILY MEDICINE CLINIC | Facility: CLINIC | Age: 45
End: 2025-06-15

## 2025-06-16 DIAGNOSIS — E03.9 HYPOTHYROIDISM, UNSPECIFIED TYPE: ICD-10-CM

## 2025-06-16 RX ORDER — LEVOTHYROXINE SODIUM 25 UG/1
25 TABLET ORAL DAILY
Qty: 90 TABLET | Refills: 1 | Status: SHIPPED | OUTPATIENT
Start: 2025-06-16

## 2025-08-07 ENCOUNTER — HOSPITAL ENCOUNTER (OUTPATIENT)
Dept: NON INVASIVE DIAGNOSTICS | Facility: HOSPITAL | Age: 45
Discharge: HOME | End: 2025-08-07
Attending: SURGERY
Payer: COMMERCIAL

## 2025-08-07 DIAGNOSIS — R29.898 SENSATION OF HEAVINESS IN EXTREMITIES: ICD-10-CM

## 2025-08-07 DIAGNOSIS — R20.2 PARESTHESIA OF RIGHT LOWER EXTREMITY: ICD-10-CM

## 2025-08-07 DIAGNOSIS — R52 PAIN AGGRAVATED BY EXERCISE: ICD-10-CM

## 2025-08-07 PROCEDURE — 93925 LOWER EXTREMITY STUDY: CPT

## 2025-08-07 PROCEDURE — 93924 LWR XTR VASC STDY BILAT: CPT

## 2025-08-07 PROCEDURE — 93978 VASCULAR STUDY: CPT

## 2025-08-07 PROCEDURE — 93922 UPR/L XTREMITY ART 2 LEVELS: CPT | Performed by: SURGERY

## 2025-08-07 PROCEDURE — 93978 VASCULAR STUDY: CPT | Performed by: SURGERY

## 2025-08-07 PROCEDURE — 93923 UPR/LXTR ART STDY 3+ LVLS: CPT

## 2025-08-07 PROCEDURE — 93924 LWR XTR VASC STDY BILAT: CPT | Performed by: SURGERY

## 2025-08-07 PROCEDURE — 93925 LOWER EXTREMITY STUDY: CPT | Performed by: SURGERY

## 2025-08-20 ENCOUNTER — TELEPHONE (OUTPATIENT)
Dept: VASCULAR SURGERY | Facility: CLINIC | Age: 45
End: 2025-08-20